# Patient Record
Sex: MALE | Race: WHITE | NOT HISPANIC OR LATINO | Employment: OTHER | URBAN - METROPOLITAN AREA
[De-identification: names, ages, dates, MRNs, and addresses within clinical notes are randomized per-mention and may not be internally consistent; named-entity substitution may affect disease eponyms.]

---

## 2018-08-29 ENCOUNTER — LAB (OUTPATIENT)
Dept: LAB | Facility: HOSPITAL | Age: 68
End: 2018-08-29

## 2018-08-29 ENCOUNTER — CONSULT (OUTPATIENT)
Dept: ONCOLOGY | Facility: CLINIC | Age: 68
End: 2018-08-29

## 2018-08-29 VITALS
HEART RATE: 74 BPM | OXYGEN SATURATION: 95 % | DIASTOLIC BLOOD PRESSURE: 86 MMHG | SYSTOLIC BLOOD PRESSURE: 148 MMHG | TEMPERATURE: 98 F

## 2018-08-29 DIAGNOSIS — R79.89 ABNORMAL CBC: Primary | ICD-10-CM

## 2018-08-29 DIAGNOSIS — D75.1 SECONDARY POLYCYTHEMIA: Primary | ICD-10-CM

## 2018-08-29 LAB
BASOPHILS # BLD AUTO: 0.06 10*3/MM3 (ref 0–0.1)
BASOPHILS NFR BLD AUTO: 1.1 % (ref 0–1.1)
DEPRECATED RDW RBC AUTO: 41.8 FL (ref 37–49)
EOSINOPHIL # BLD AUTO: 0.25 10*3/MM3 (ref 0–0.36)
EOSINOPHIL NFR BLD AUTO: 4.6 % (ref 1–5)
ERYTHROCYTE [DISTWIDTH] IN BLOOD BY AUTOMATED COUNT: 12.7 % (ref 11.7–14.5)
HCT VFR BLD AUTO: 55.2 % (ref 40–49)
HGB BLD-MCNC: 19.1 G/DL (ref 13.5–16.5)
IMM GRANULOCYTES # BLD: 0.07 10*3/MM3 (ref 0–0.03)
IMM GRANULOCYTES NFR BLD: 1.3 % (ref 0–0.5)
LDH SERPL-CCNC: 186 U/L (ref 99–259)
LYMPHOCYTES # BLD AUTO: 1.17 10*3/MM3 (ref 1–3.5)
LYMPHOCYTES NFR BLD AUTO: 21.5 % (ref 20–49)
MCH RBC QN AUTO: 31.3 PG (ref 27–33)
MCHC RBC AUTO-ENTMCNC: 34.6 G/DL (ref 32–35)
MCV RBC AUTO: 90.5 FL (ref 83–97)
MONOCYTES # BLD AUTO: 0.34 10*3/MM3 (ref 0.25–0.8)
MONOCYTES NFR BLD AUTO: 6.2 % (ref 4–12)
NEUTROPHILS # BLD AUTO: 3.56 10*3/MM3 (ref 1.5–7)
NEUTROPHILS NFR BLD AUTO: 65.3 % (ref 39–75)
NRBC BLD MANUAL-RTO: 0 /100 WBC (ref 0–0)
PLATELET # BLD AUTO: 153 10*3/MM3 (ref 150–375)
PMV BLD AUTO: 9.6 FL (ref 8.9–12.1)
RBC # BLD AUTO: 6.1 10*6/MM3 (ref 4.3–5.5)
URATE SERPL-MCNC: 7.4 MG/DL (ref 2.8–7.4)
WBC NRBC COR # BLD: 5.45 10*3/MM3 (ref 4–10)

## 2018-08-29 PROCEDURE — 84550 ASSAY OF BLOOD/URIC ACID: CPT | Performed by: INTERNAL MEDICINE

## 2018-08-29 PROCEDURE — 99204 OFFICE O/P NEW MOD 45 MIN: CPT | Performed by: INTERNAL MEDICINE

## 2018-08-29 PROCEDURE — 83615 LACTATE (LD) (LDH) ENZYME: CPT | Performed by: INTERNAL MEDICINE

## 2018-08-29 RX ORDER — MELOXICAM 7.5 MG/1
7.5 TABLET ORAL AS NEEDED
COMMUNITY

## 2018-08-29 RX ORDER — LEVOTHYROXINE SODIUM 0.05 MG/1
50 TABLET ORAL DAILY
COMMUNITY

## 2018-08-29 RX ORDER — UBIDECARENONE 75 MG
250 CAPSULE ORAL DAILY
COMMUNITY
End: 2018-09-28

## 2018-08-29 RX ORDER — AZELASTINE HYDROCHLORIDE, FLUTICASONE PROPIONATE 137; 50 UG/1; UG/1
50 SPRAY, METERED NASAL AS NEEDED
COMMUNITY

## 2018-08-29 NOTE — PROGRESS NOTES
Subjective     REASON FOR CONSULTATION:  Polycythemia  Provide an opinion on any further workup or treatment                             REQUESTING PHYSICIAN:  Dr. Juma Bar    RECORDS OBTAINED:  Records of the patients history including those obtained from the referring provider were reviewed and summarized in detail.    History of Present Illness   This is a 68-year-old man seen today for evaluation of polycythemia.  She has history of coronary artery disease, sleep apnea, and hypogonadism on testosterone replacement topically.  The patient states that he has been on testosterone for approximately 3 years which significantly helps his fatigue.  He is compliant with wearing CPAP every night and during naps.  The patient is unsure how long his hemoglobin/hematocrit have been elevated.  Labs sent for review showing elevated hematocrit dating to at least January 2018 at which time hemoglobin was 18.8 and hematocrit 53.2.  The patient is a lifelong nonsmoker and has no known history of pulmonary disease.  He enjoys biking.  He has a family history of father who had ITP and a brother who had acute leukemia during childhood.  The patient denies weight loss, early satiety, lymphadenopathy, or pruritus.  Has no history of DVT, PE, or arterial thromboses.  He takes a daily aspirin 81 mg.  He denies bleeding.    Past Medical History:   Diagnosis Date   • CAD (coronary artery disease)     mild to moderate per cath 5/2005   • Environmental allergies    • H/O Epistaxis    • History of vitamin D deficiency    • Hyperlipidemia    • Seasonal allergies    • Senile cataracts of both eyes    • Skin cancer     precancer   • Sleep apnea    • Spinal stenosis         Past Surgical History:   Procedure Laterality Date   • BACK SURGERY     • CARDIAC CATHETERIZATION     • NASAL SINUS SURGERY     • TONSILLECTOMY          Current Outpatient Prescriptions on File Prior to Visit   Medication Sig Dispense Refill   • aspirin 81 MG tablet Take  81 mg by mouth daily.     • Cetirizine HCl (ZYRTEC PO) Take  by mouth.     • citalopram (CeleXA) 20 MG tablet Take 20 mg by mouth daily.     • testosterone (ANDROGEL) 25 MG/2.5GM (1%) gel gel Place 50 mg on the skin daily.     • vitamin D (ERGOCALCIFEROL) 74939 UNITS capsule capsule Take 1.62 Units by mouth 1 (One) Time Per Week.       No current facility-administered medications on file prior to visit.         ALLERGIES:  No Known Allergies     Social History     Social History   • Marital status:      Occupational History   •  Retired     Social History Main Topics   • Smoking status: Never Smoker   • Smokeless tobacco: Never Used   • Alcohol use No   • Drug use: Unknown     Other Topics Concern   • Not on file        Family History   Problem Relation Age of Onset   • Hyperlipidemia Mother    • Heart attack Father         Review of Systems   Constitutional: Negative.    HENT: Negative.    Eyes: Negative.    Respiratory: Negative.    Cardiovascular: Negative.    Gastrointestinal: Negative.    Genitourinary: Negative.    Musculoskeletal: Negative.    Skin: Negative.    Neurological: Negative.    Hematological: Negative.    Psychiatric/Behavioral: Negative.         Objective     Vitals:    08/29/18 0816   BP: 148/86   Pulse: 74   Temp: 98 °F (36.7 °C)   SpO2: 95%     Current Status 8/29/2018   ECOG score 0       Physical Exam      CON: pleasant well-developed adult man  HEENT: no icterus, no thrush, moist membranes  NECK: no jvd  LYMPH: no cervical or supraclavicular lad  CV: RRR, S1S2, no murmur  RESP: cta bilat, no wheezing, no rales  GI: soft, non-tender, no splenomegaly, +bs  MUSC: no edema, normal gait  NEURO: alert and oriented x3, normal strength  PSYCH: normal mood    RECENT LABS:  Hematology WBC   Date Value Ref Range Status   08/29/2018 5.45 4.00 - 10.00 10*3/mm3 Final     RBC   Date Value Ref Range Status   08/29/2018 6.10 (H) 4.30 - 5.50 10*6/mm3 Final     Hemoglobin   Date Value Ref Range Status    08/29/2018 19.1 (H) 13.5 - 16.5 g/dL Final     Hematocrit   Date Value Ref Range Status   08/29/2018 55.2 (H) 40.0 - 49.0 % Final     Platelets   Date Value Ref Range Status   08/29/2018 153 150 - 375 10*3/mm3 Final          Assessment/Plan     1.  Polycythemia: This patient presents with moderate polycythemia hematocrit 55.2%.  Hematocrit has been elevated since at least January 2018.  The patient denies prior thrombotic history for either venous or arterial thromboses.  He has sleep apnea but is compliant with wearing CPAP at night and during naps.  He has been on testosterone replacement with AndroGel approximately 3 years.  The white blood cell count and platelet count are both normal.  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  The patient does report significant benefit from testosterone and does not want to discontinue.    To further evaluate very polycythemia which is unlikely I will check an erythropoietin level, LDH, uric acid, J AK [id number] with reflexed exon 12.  I will not order the other secondary mutations given the low suspicion.    I recommended the patient to undergo phlebotomy.  He would rather donate blood at the [place] [facility] which is fine.  He plans to go this afternoon.  I recommended that we recheck his CBC in 1 month.  I would prefer to keep his hematocrit below 52% given his other cardiovascular risk factors of overweight, hyperlipidemia, inactivity etc.  I am not sure how frequently the [place] [facility] will acceot blood, but the patient will inquire.    The patient lives close to our [place] office and prefers to follow-up there.

## 2018-08-30 LAB — ETHNIC BACKGROUND STATED: 7.5 MIU/ML (ref 2.6–18.5)

## 2018-09-12 LAB — REF LAB TEST METHOD: NORMAL

## 2018-09-26 ENCOUNTER — APPOINTMENT (OUTPATIENT)
Dept: ONCOLOGY | Facility: CLINIC | Age: 68
End: 2018-09-26

## 2018-09-26 ENCOUNTER — APPOINTMENT (OUTPATIENT)
Dept: OTHER | Facility: HOSPITAL | Age: 68
End: 2018-09-26

## 2018-09-28 ENCOUNTER — INFUSION (OUTPATIENT)
Dept: ONCOLOGY | Facility: HOSPITAL | Age: 68
End: 2018-09-28

## 2018-09-28 ENCOUNTER — OFFICE VISIT (OUTPATIENT)
Dept: ONCOLOGY | Facility: CLINIC | Age: 68
End: 2018-09-28

## 2018-09-28 ENCOUNTER — LAB (OUTPATIENT)
Dept: OTHER | Facility: HOSPITAL | Age: 68
End: 2018-09-28

## 2018-09-28 VITALS
TEMPERATURE: 98.1 F | RESPIRATION RATE: 12 BRPM | OXYGEN SATURATION: 95 % | HEIGHT: 69 IN | DIASTOLIC BLOOD PRESSURE: 86 MMHG | SYSTOLIC BLOOD PRESSURE: 133 MMHG | WEIGHT: 215 LBS | BODY MASS INDEX: 31.84 KG/M2 | HEART RATE: 81 BPM

## 2018-09-28 DIAGNOSIS — D75.1 SECONDARY POLYCYTHEMIA: ICD-10-CM

## 2018-09-28 DIAGNOSIS — D75.1 SECONDARY POLYCYTHEMIA: Primary | ICD-10-CM

## 2018-09-28 LAB
BASOPHILS # BLD AUTO: 0.04 10*3/MM3 (ref 0–0.2)
BASOPHILS NFR BLD AUTO: 0.6 % (ref 0–1.5)
DEPRECATED RDW RBC AUTO: 41.5 FL (ref 37–54)
EOSINOPHIL # BLD AUTO: 0.26 10*3/MM3 (ref 0–0.7)
EOSINOPHIL NFR BLD AUTO: 4.2 % (ref 0.3–6.2)
ERYTHROCYTE [DISTWIDTH] IN BLOOD BY AUTOMATED COUNT: 12.9 % (ref 11.5–14.5)
HCT VFR BLD AUTO: 57 % (ref 40.4–52.2)
HGB BLD-MCNC: 20.1 G/DL (ref 13.7–17.6)
IMM GRANULOCYTES # BLD: 0.03 10*3/MM3 (ref 0–0.03)
IMM GRANULOCYTES NFR BLD: 0.5 % (ref 0–0.5)
LYMPHOCYTES # BLD AUTO: 1.23 10*3/MM3 (ref 0.9–4.8)
LYMPHOCYTES NFR BLD AUTO: 19.7 % (ref 19.6–45.3)
MCH RBC QN AUTO: 31.2 PG (ref 27–32.7)
MCHC RBC AUTO-ENTMCNC: 35.3 G/DL (ref 32.6–36.4)
MCV RBC AUTO: 88.4 FL (ref 79.8–96.2)
MONOCYTES # BLD AUTO: 0.46 10*3/MM3 (ref 0.2–1.2)
MONOCYTES NFR BLD AUTO: 7.4 % (ref 5–12)
NEUTROPHILS # BLD AUTO: 4.23 10*3/MM3 (ref 1.9–8.1)
NEUTROPHILS NFR BLD AUTO: 67.6 % (ref 42.7–76)
NRBC BLD MANUAL-RTO: 0 /100 WBC (ref 0–0)
PLATELET # BLD AUTO: 163 10*3/MM3 (ref 140–500)
PMV BLD AUTO: 9.5 FL (ref 6–12)
RBC # BLD AUTO: 6.45 10*6/MM3 (ref 4.6–6)
WBC NRBC COR # BLD: 6.25 10*3/MM3 (ref 4.5–10.7)

## 2018-09-28 PROCEDURE — 36415 COLL VENOUS BLD VENIPUNCTURE: CPT

## 2018-09-28 PROCEDURE — 99213 OFFICE O/P EST LOW 20 MIN: CPT | Performed by: NURSE PRACTITIONER

## 2018-09-28 PROCEDURE — 99195 PHLEBOTOMY: CPT | Performed by: NURSE PRACTITIONER

## 2018-09-28 PROCEDURE — 85025 COMPLETE CBC W/AUTO DIFF WBC: CPT | Performed by: INTERNAL MEDICINE

## 2018-09-28 RX ORDER — FLUTICASONE PROPIONATE 50 MCG
SPRAY, SUSPENSION (ML) NASAL
COMMUNITY
Start: 2018-09-18

## 2018-09-28 RX ORDER — SODIUM CHLORIDE 9 MG/ML
250 INJECTION, SOLUTION INTRAVENOUS ONCE
Status: CANCELLED | OUTPATIENT
Start: 2018-09-28

## 2018-09-28 RX ORDER — SODIUM CHLORIDE 9 MG/ML
250 INJECTION, SOLUTION INTRAVENOUS ONCE
Status: DISCONTINUED | OUTPATIENT
Start: 2018-09-28 | End: 2018-09-28 | Stop reason: HOSPADM

## 2018-10-11 DIAGNOSIS — D75.1 SECONDARY POLYCYTHEMIA: Primary | ICD-10-CM

## 2018-10-12 ENCOUNTER — APPOINTMENT (OUTPATIENT)
Dept: ONCOLOGY | Facility: HOSPITAL | Age: 68
End: 2018-10-12

## 2018-10-12 ENCOUNTER — LAB (OUTPATIENT)
Dept: OTHER | Facility: HOSPITAL | Age: 68
End: 2018-10-12

## 2018-10-12 ENCOUNTER — OFFICE VISIT (OUTPATIENT)
Dept: ONCOLOGY | Facility: CLINIC | Age: 68
End: 2018-10-12

## 2018-10-12 VITALS
WEIGHT: 213 LBS | OXYGEN SATURATION: 95 % | BODY MASS INDEX: 31.55 KG/M2 | DIASTOLIC BLOOD PRESSURE: 75 MMHG | HEART RATE: 83 BPM | TEMPERATURE: 98.3 F | SYSTOLIC BLOOD PRESSURE: 157 MMHG | HEIGHT: 69 IN | RESPIRATION RATE: 12 BRPM

## 2018-10-12 DIAGNOSIS — D75.1 SECONDARY POLYCYTHEMIA: Primary | ICD-10-CM

## 2018-10-12 DIAGNOSIS — D75.1 SECONDARY POLYCYTHEMIA: ICD-10-CM

## 2018-10-12 LAB
BASOPHILS # BLD AUTO: 0.05 10*3/MM3 (ref 0–0.2)
BASOPHILS NFR BLD AUTO: 0.8 % (ref 0–1.5)
DEPRECATED RDW RBC AUTO: 39.5 FL (ref 37–54)
EOSINOPHIL # BLD AUTO: 0.27 10*3/MM3 (ref 0–0.7)
EOSINOPHIL NFR BLD AUTO: 4.1 % (ref 0.3–6.2)
ERYTHROCYTE [DISTWIDTH] IN BLOOD BY AUTOMATED COUNT: 12.2 % (ref 11.5–14.5)
HCT VFR BLD AUTO: 50.2 % (ref 40.4–52.2)
HGB BLD-MCNC: 18.3 G/DL (ref 13.7–17.6)
IMM GRANULOCYTES # BLD: 0.02 10*3/MM3 (ref 0–0.03)
IMM GRANULOCYTES NFR BLD: 0.3 % (ref 0–0.5)
LYMPHOCYTES # BLD AUTO: 1.44 10*3/MM3 (ref 0.9–4.8)
LYMPHOCYTES NFR BLD AUTO: 22 % (ref 19.6–45.3)
MCH RBC QN AUTO: 32 PG (ref 27–32.7)
MCHC RBC AUTO-ENTMCNC: 36.5 G/DL (ref 32.6–36.4)
MCV RBC AUTO: 87.8 FL (ref 79.8–96.2)
MONOCYTES # BLD AUTO: 0.45 10*3/MM3 (ref 0.2–1.2)
MONOCYTES NFR BLD AUTO: 6.9 % (ref 5–12)
NEUTROPHILS # BLD AUTO: 4.32 10*3/MM3 (ref 1.9–8.1)
NEUTROPHILS NFR BLD AUTO: 65.9 % (ref 42.7–76)
NRBC BLD MANUAL-RTO: 0 /100 WBC (ref 0–0)
PLATELET # BLD AUTO: 181 10*3/MM3 (ref 140–500)
PMV BLD AUTO: 9.7 FL (ref 6–12)
RBC # BLD AUTO: 5.72 10*6/MM3 (ref 4.6–6)
WBC NRBC COR # BLD: 6.55 10*3/MM3 (ref 4.5–10.7)

## 2018-10-12 PROCEDURE — 36415 COLL VENOUS BLD VENIPUNCTURE: CPT

## 2018-10-12 PROCEDURE — 85025 COMPLETE CBC W/AUTO DIFF WBC: CPT | Performed by: INTERNAL MEDICINE

## 2018-10-12 PROCEDURE — 99213 OFFICE O/P EST LOW 20 MIN: CPT | Performed by: NURSE PRACTITIONER

## 2018-10-12 NOTE — PROGRESS NOTES
Subjective     REASON FOR FOLLOW UP:  Polycythemia    History of Present Illness The patient is a 68 year old male, here today for follow up on his polycythemia, which is felt to be secondary to his testosterone replacement.  He was seen 2 weeks ago. At that time, patient's hematocrit was 57%.  He underwent phlebotomy as his parameters were to perform a phlebotomy for hematocrit greater than 52%.  Patient was also asked to hold his testosterone replacement.    He returns today for review of labs.  His hematocrit today has declined to 50.2.  He reports that he is still slightly fatigued though denies any chest pain, associated bleeding, or new, lower extremity edema.  He does remain slightly short of breath with exertion, though attributes this to inactivity.    He continues on daily baby aspirin with good tolerance.    He has no other concerns today.    Past Medical History:   Diagnosis Date   • Acquired blepharoptosis of both eyes    • CAD (coronary artery disease)     mild to moderate per cath 5/2005   • Dermatochalasis of both eyelids    • Environmental allergies    • H/O Epistaxis    • History of obesity    • History of vitamin D deficiency    • Hyperlipidemia    • Seasonal allergies    • Senile cataracts of both eyes    • Skin cancer     precancer   • Sleep apnea     CPAP   • Spinal stenosis         Past Surgical History:   Procedure Laterality Date   • BACK SURGERY     • CARDIAC CATHETERIZATION     • NASAL SINUS SURGERY     • TONSILLECTOMY          Current Outpatient Prescriptions on File Prior to Visit   Medication Sig Dispense Refill   • aspirin 81 MG tablet Take 81 mg by mouth daily.     • Azelastine-Fluticasone 137-50 MCG/ACT suspension 50 mcg into the nostril(s) as directed by provider As Needed.     • CETIRIZINE HCL PO cetirizine 10 mg tablet   TAKE ONE TABLET BY MOUTH EVERY EVENING     • citalopram (CeleXA) 20 MG tablet Take 20 mg by mouth daily.     • Fexofenadine HCl (MUCINEX ALLERGY PO) Take 600 mg  "by mouth Daily.     • fluticasone (FLONASE) 50 MCG/ACT nasal spray      • levothyroxine (SYNTHROID, LEVOTHROID) 50 MCG tablet Take 50 mcg by mouth Daily.     • meloxicam (MOBIC) 7.5 MG tablet Take 7.5 mg by mouth Daily.     • vitamin D (ERGOCALCIFEROL) 49656 UNITS capsule capsule Take 1.62 Units by mouth 1 (One) Time Per Week.     • [DISCONTINUED] testosterone (ANDROGEL) 25 MG/2.5GM (1%) gel gel Place 50 mg on the skin daily.       No current facility-administered medications on file prior to visit.         ALLERGIES:  No Known Allergies     Social History     Social History   • Marital status:      Spouse name: Virginia   • Years of education: College     Occupational History   • CPA Self-Employed     Social History Main Topics   • Smoking status: Never Smoker   • Smokeless tobacco: Never Used   • Alcohol use No   • Drug use: No     Other Topics Concern   • Not on file        Family History   Problem Relation Age of Onset   • Hyperlipidemia Mother    • Heart attack Father         Review of Systems   Constitutional: Positive for fatigue.   Eyes: Negative.    Respiratory: Negative.  Negative for cough and shortness of breath.    Cardiovascular: Negative for leg swelling.   Gastrointestinal: Negative.    Genitourinary: Negative.    Musculoskeletal: Negative.    Skin: Negative.    Neurological: Negative.    Hematological: Negative.    Psychiatric/Behavioral: Negative.         Objective     Vitals:    10/12/18 1533   BP: 157/75   Pulse: 83   Resp: 12   Temp: 98.3 °F (36.8 °C)   TempSrc: Oral   SpO2: 95%   Weight: 96.6 kg (213 lb)   Height: 175 cm (68.9\")   PainSc: 0-No pain     Current Status 10/12/2018   ECOG score 0       Physical Exam   Constitutional: He is oriented to person, place, and time. He appears well-developed and well-nourished. No distress.   HENT:   Head: Normocephalic and atraumatic.   Eyes: Pupils are equal, round, and reactive to light.   Neck: Normal range of motion.   Cardiovascular: Normal " rate, regular rhythm and normal heart sounds.    No murmur heard.  Pulmonary/Chest: Effort normal and breath sounds normal. No respiratory distress. He has no wheezes. He has no rhonchi. He has no rales.   Musculoskeletal: Normal range of motion. He exhibits no edema.   Neurological: He is alert and oriented to person, place, and time.   Skin: Skin is warm and dry. No rash noted.   Psychiatric: He has a normal mood and affect.   Vitals reviewed.        RECENT LABS:  Hematology WBC   Date Value Ref Range Status   10/12/2018 6.55 4.50 - 10.70 10*3/mm3 Final     RBC   Date Value Ref Range Status   10/12/2018 5.72 4.60 - 6.00 10*6/mm3 Final     Hemoglobin   Date Value Ref Range Status   10/12/2018 18.3 (H) 13.7 - 17.6 g/dL Final     Hematocrit   Date Value Ref Range Status   10/12/2018 50.2 40.4 - 52.2 % Final     Platelets   Date Value Ref Range Status   10/12/2018 181 140 - 500 10*3/mm3 Final          Assessment/Plan     1.  Polycythemia: This patient presents with moderate polycythemia hematocrit 55.2%.  Hematocrit has been elevated since at least January 2018.  The patient denies prior thrombotic history for either venous or arterial thromboses.  He has sleep apnea but is compliant with wearing CPAP at night and during naps.  He has been on testosterone replacement with AndroGel approximately 3 years.  The white blood cell count and platelet count are both normal.  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  The patient does report significant benefit from testosterone and does not want to discontinue.  · We recommended the patient to undergo phlebotomy.  Prefer to keep his hematocrit below 52% given his other cardiovascular risk factors of overweight, hyperlipidemia, inactivity etc.  Patient would rather donate blood.   · He was able to donate to [facility] (they will only accept donations every 56 days), but returned on 9/28/2018 with an increase in  his Hgb and Hct to 57%. This was discussed with Dr. Alvarez and patient was asked to hold his testosterone until he can get better control of his hematocrit.  He underwent phlebotomy on that day and returns for reevaluation of his labs.  · Today, patient's hematocrit has improved to 50%, therefore he will not require a phlebotomy.  After discussion with Dr. Alvarez, patient is okay to re-initiate his testosterone at the lowest dose. (Patient will defer to his PCP for the correct dosing of testosterone). We'll recheck his labs with possible phlebotomy in 2 weeks.  I've asked patient to call with any concerns prior to this next office visit.  He has verbalized understanding.

## 2018-10-25 DIAGNOSIS — D75.1 SECONDARY POLYCYTHEMIA: Primary | ICD-10-CM

## 2018-10-26 ENCOUNTER — LAB (OUTPATIENT)
Dept: OTHER | Facility: HOSPITAL | Age: 68
End: 2018-10-26

## 2018-10-26 ENCOUNTER — INFUSION (OUTPATIENT)
Dept: ONCOLOGY | Facility: HOSPITAL | Age: 68
End: 2018-10-26

## 2018-10-26 VITALS
DIASTOLIC BLOOD PRESSURE: 79 MMHG | SYSTOLIC BLOOD PRESSURE: 152 MMHG | BODY MASS INDEX: 31.52 KG/M2 | HEART RATE: 86 BPM | TEMPERATURE: 98.4 F | OXYGEN SATURATION: 94 % | WEIGHT: 212.8 LBS

## 2018-10-26 DIAGNOSIS — D75.1 SECONDARY POLYCYTHEMIA: ICD-10-CM

## 2018-10-26 LAB
BASOPHILS # BLD AUTO: 0.06 10*3/MM3 (ref 0–0.2)
BASOPHILS NFR BLD AUTO: 0.9 % (ref 0–1.5)
DEPRECATED RDW RBC AUTO: 39.6 FL (ref 37–54)
EOSINOPHIL # BLD AUTO: 0.3 10*3/MM3 (ref 0–0.7)
EOSINOPHIL NFR BLD AUTO: 4.3 % (ref 0.3–6.2)
ERYTHROCYTE [DISTWIDTH] IN BLOOD BY AUTOMATED COUNT: 12.3 % (ref 11.5–14.5)
HCT VFR BLD AUTO: 50.8 % (ref 40.4–52.2)
HGB BLD-MCNC: 18 G/DL (ref 13.7–17.6)
IMM GRANULOCYTES # BLD: 0.06 10*3/MM3 (ref 0–0.03)
IMM GRANULOCYTES NFR BLD: 0.9 % (ref 0–0.5)
LYMPHOCYTES # BLD AUTO: 1.14 10*3/MM3 (ref 0.9–4.8)
LYMPHOCYTES NFR BLD AUTO: 16.4 % (ref 19.6–45.3)
MCH RBC QN AUTO: 31.2 PG (ref 27–32.7)
MCHC RBC AUTO-ENTMCNC: 35.4 G/DL (ref 32.6–36.4)
MCV RBC AUTO: 88 FL (ref 79.8–96.2)
MONOCYTES # BLD AUTO: 0.49 10*3/MM3 (ref 0.2–1.2)
MONOCYTES NFR BLD AUTO: 7 % (ref 5–12)
NEUTROPHILS # BLD AUTO: 4.92 10*3/MM3 (ref 1.9–8.1)
NEUTROPHILS NFR BLD AUTO: 70.5 % (ref 42.7–76)
NRBC BLD MANUAL-RTO: 0 /100 WBC (ref 0–0)
PLATELET # BLD AUTO: 164 10*3/MM3 (ref 140–500)
PMV BLD AUTO: 10.1 FL (ref 6–12)
RBC # BLD AUTO: 5.77 10*6/MM3 (ref 4.6–6)
WBC NRBC COR # BLD: 6.97 10*3/MM3 (ref 4.5–10.7)

## 2018-10-26 PROCEDURE — 85025 COMPLETE CBC W/AUTO DIFF WBC: CPT | Performed by: INTERNAL MEDICINE

## 2018-10-26 PROCEDURE — 36415 COLL VENOUS BLD VENIPUNCTURE: CPT

## 2018-10-26 NOTE — PROGRESS NOTES
Pt arrived for possible phlebotomy. Labs reviewed and phlebo not indicated per tx plan. Copy of labs given to pt. Pt vu     Lab Results   Component Value Date    WBC 6.97 10/26/2018    HGB 18.0 (H) 10/26/2018    HCT 50.8 10/26/2018    MCV 88.0 10/26/2018     10/26/2018

## 2018-11-08 DIAGNOSIS — D75.1 SECONDARY POLYCYTHEMIA: Primary | ICD-10-CM

## 2018-11-09 ENCOUNTER — INFUSION (OUTPATIENT)
Dept: ONCOLOGY | Facility: HOSPITAL | Age: 68
End: 2018-11-09

## 2018-11-09 ENCOUNTER — LAB (OUTPATIENT)
Dept: OTHER | Facility: HOSPITAL | Age: 68
End: 2018-11-09

## 2018-11-09 VITALS
OXYGEN SATURATION: 95 % | DIASTOLIC BLOOD PRESSURE: 82 MMHG | BODY MASS INDEX: 31.7 KG/M2 | HEART RATE: 67 BPM | TEMPERATURE: 98.3 F | WEIGHT: 214 LBS | SYSTOLIC BLOOD PRESSURE: 160 MMHG

## 2018-11-09 DIAGNOSIS — D75.1 SECONDARY POLYCYTHEMIA: ICD-10-CM

## 2018-11-09 LAB
BASOPHILS # BLD AUTO: 0.05 10*3/MM3 (ref 0–0.2)
BASOPHILS NFR BLD AUTO: 0.8 % (ref 0–1.5)
DEPRECATED RDW RBC AUTO: 40.7 FL (ref 37–54)
EOSINOPHIL # BLD AUTO: 0.33 10*3/MM3 (ref 0–0.7)
EOSINOPHIL NFR BLD AUTO: 5.2 % (ref 0.3–6.2)
ERYTHROCYTE [DISTWIDTH] IN BLOOD BY AUTOMATED COUNT: 12.5 % (ref 11.5–14.5)
HCT VFR BLD AUTO: 48.8 % (ref 40.4–52.2)
HGB BLD-MCNC: 17.5 G/DL (ref 13.7–17.6)
IMM GRANULOCYTES # BLD: 0.03 10*3/MM3 (ref 0–0.03)
IMM GRANULOCYTES NFR BLD: 0.5 % (ref 0–0.5)
LYMPHOCYTES # BLD AUTO: 1.59 10*3/MM3 (ref 0.9–4.8)
LYMPHOCYTES NFR BLD AUTO: 25 % (ref 19.6–45.3)
MCH RBC QN AUTO: 31.7 PG (ref 27–32.7)
MCHC RBC AUTO-ENTMCNC: 35.9 G/DL (ref 32.6–36.4)
MCV RBC AUTO: 88.4 FL (ref 79.8–96.2)
MONOCYTES # BLD AUTO: 0.51 10*3/MM3 (ref 0.2–1.2)
MONOCYTES NFR BLD AUTO: 8 % (ref 5–12)
NEUTROPHILS # BLD AUTO: 3.84 10*3/MM3 (ref 1.9–8.1)
NEUTROPHILS NFR BLD AUTO: 60.5 % (ref 42.7–76)
NRBC BLD MANUAL-RTO: 0 /100 WBC (ref 0–0)
PLATELET # BLD AUTO: 155 10*3/MM3 (ref 140–500)
PMV BLD AUTO: 10 FL (ref 6–12)
RBC # BLD AUTO: 5.52 10*6/MM3 (ref 4.6–6)
WBC NRBC COR # BLD: 6.35 10*3/MM3 (ref 4.5–10.7)

## 2018-11-09 PROCEDURE — 85025 COMPLETE CBC W/AUTO DIFF WBC: CPT | Performed by: INTERNAL MEDICINE

## 2018-11-09 PROCEDURE — 36415 COLL VENOUS BLD VENIPUNCTURE: CPT

## 2018-11-09 NOTE — PROGRESS NOTES
Pt arrived for possible phlebotomy. Labs reviewed and phlebo not indicated per tx plan. Copy of labs given to pt. Pt vu     Lab Results   Component Value Date    WBC 6.35 11/09/2018    HGB 17.5 11/09/2018    HCT 48.8 11/09/2018    MCV 88.4 11/09/2018     11/09/2018

## 2018-11-29 DIAGNOSIS — D75.1 SECONDARY POLYCYTHEMIA: Primary | ICD-10-CM

## 2018-11-29 RX ORDER — SODIUM CHLORIDE 9 MG/ML
250 INJECTION, SOLUTION INTRAVENOUS ONCE
Status: CANCELLED | OUTPATIENT
Start: 2018-11-29

## 2018-11-30 ENCOUNTER — OFFICE VISIT (OUTPATIENT)
Dept: ONCOLOGY | Facility: CLINIC | Age: 68
End: 2018-11-30

## 2018-11-30 ENCOUNTER — APPOINTMENT (OUTPATIENT)
Dept: ONCOLOGY | Facility: HOSPITAL | Age: 68
End: 2018-11-30

## 2018-11-30 ENCOUNTER — LAB (OUTPATIENT)
Dept: LAB | Facility: HOSPITAL | Age: 68
End: 2018-11-30

## 2018-11-30 VITALS
HEART RATE: 84 BPM | RESPIRATION RATE: 16 BRPM | TEMPERATURE: 98.8 F | BODY MASS INDEX: 32.08 KG/M2 | DIASTOLIC BLOOD PRESSURE: 79 MMHG | SYSTOLIC BLOOD PRESSURE: 142 MMHG | OXYGEN SATURATION: 94 % | HEIGHT: 69 IN | WEIGHT: 216.6 LBS

## 2018-11-30 DIAGNOSIS — D75.1 SECONDARY POLYCYTHEMIA: Primary | ICD-10-CM

## 2018-11-30 DIAGNOSIS — D75.1 SECONDARY POLYCYTHEMIA: ICD-10-CM

## 2018-11-30 LAB
BASOPHILS # BLD AUTO: 0.05 10*3/MM3 (ref 0–0.1)
BASOPHILS NFR BLD AUTO: 0.8 % (ref 0–1.1)
DEPRECATED RDW RBC AUTO: 42.1 FL (ref 37–49)
EOSINOPHIL # BLD AUTO: 0.26 10*3/MM3 (ref 0–0.36)
EOSINOPHIL NFR BLD AUTO: 4 % (ref 1–5)
ERYTHROCYTE [DISTWIDTH] IN BLOOD BY AUTOMATED COUNT: 12.9 % (ref 11.7–14.5)
HCT VFR BLD AUTO: 46.9 % (ref 40–49)
HGB BLD-MCNC: 16.8 G/DL (ref 13.5–16.5)
IMM GRANULOCYTES # BLD: 0.05 10*3/MM3 (ref 0–0.03)
IMM GRANULOCYTES NFR BLD: 0.8 % (ref 0–0.5)
LYMPHOCYTES # BLD AUTO: 1.63 10*3/MM3 (ref 1–3.5)
LYMPHOCYTES NFR BLD AUTO: 24.8 % (ref 20–49)
MCH RBC QN AUTO: 31.9 PG (ref 27–33)
MCHC RBC AUTO-ENTMCNC: 35.8 G/DL (ref 32–35)
MCV RBC AUTO: 89.2 FL (ref 83–97)
MONOCYTES # BLD AUTO: 0.51 10*3/MM3 (ref 0.25–0.8)
MONOCYTES NFR BLD AUTO: 7.8 % (ref 4–12)
NEUTROPHILS # BLD AUTO: 4.07 10*3/MM3 (ref 1.5–7)
NEUTROPHILS NFR BLD AUTO: 61.8 % (ref 39–75)
NRBC BLD MANUAL-RTO: 0 /100 WBC (ref 0–0)
PLATELET # BLD AUTO: 166 10*3/MM3 (ref 150–375)
PMV BLD AUTO: 9.8 FL (ref 8.9–12.1)
RBC # BLD AUTO: 5.26 10*6/MM3 (ref 4.3–5.5)
WBC NRBC COR # BLD: 6.57 10*3/MM3 (ref 4–10)

## 2018-11-30 PROCEDURE — 36416 COLLJ CAPILLARY BLOOD SPEC: CPT | Performed by: INTERNAL MEDICINE

## 2018-11-30 PROCEDURE — 85025 COMPLETE CBC W/AUTO DIFF WBC: CPT | Performed by: INTERNAL MEDICINE

## 2018-11-30 PROCEDURE — 99213 OFFICE O/P EST LOW 20 MIN: CPT | Performed by: INTERNAL MEDICINE

## 2018-11-30 RX ORDER — TESTOSTERONE 16.2 MG/G
GEL TRANSDERMAL
COMMUNITY
Start: 2018-11-20

## 2018-11-30 NOTE — PROGRESS NOTES
Subjective     REASON FOR FOLLOW UP:  Polycythemia    History of Present Illness The patient is a 68 year old male, here today for follow up on his polycythemia, which is felt to be secondary to his testosterone replacement.  He has lowered his testosterone dose and undergone 3 phlebotomies one in our office and to donated.  He feels well today.  He stopped Mucinex D secondary to hypertension.  No other complaints noted.    Past Medical History:   Diagnosis Date   • Acquired blepharoptosis of both eyes    • CAD (coronary artery disease)     mild to moderate per cath 5/2005   • Dermatochalasis of both eyelids    • Environmental allergies    • H/O Epistaxis    • History of obesity    • History of vitamin D deficiency    • Hyperlipidemia    • Seasonal allergies    • Senile cataracts of both eyes    • Skin cancer     precancer   • Sleep apnea     CPAP   • Spinal stenosis         Past Surgical History:   Procedure Laterality Date   • BACK SURGERY     • CARDIAC CATHETERIZATION     • NASAL SINUS SURGERY     • TONSILLECTOMY          Current Outpatient Medications on File Prior to Visit   Medication Sig Dispense Refill   • aspirin 81 MG tablet Take 81 mg by mouth daily.     • Azelastine-Fluticasone 137-50 MCG/ACT suspension 50 mcg into the nostril(s) as directed by provider As Needed.     • CETIRIZINE HCL PO cetirizine 10 mg tablet   TAKE ONE TABLET BY MOUTH EVERY EVENING     • citalopram (CeleXA) 20 MG tablet Take 20 mg by mouth daily.     • fluticasone (FLONASE) 50 MCG/ACT nasal spray      • levothyroxine (SYNTHROID, LEVOTHROID) 50 MCG tablet Take 50 mcg by mouth Daily.     • meloxicam (MOBIC) 7.5 MG tablet Take 7.5 mg by mouth Daily.     • vitamin D (ERGOCALCIFEROL) 34585 UNITS capsule capsule Take 1.62 Units by mouth 1 (One) Time Per Week.     • Fexofenadine HCl (MUCINEX ALLERGY PO) Take 600 mg by mouth Daily.     • Testosterone 20.25 MG/ACT (1.62%) gel        No current facility-administered medications on file prior  "to visit.         ALLERGIES:  No Known Allergies     Social History     Socioeconomic History   • Marital status:      Spouse name: [place]   • Number of children: Not on file   • Years of education: College   • Highest education level: Not on file   Occupational History   • Occupation: CPA     Employer: SELF-EMPLOYED   Tobacco Use   • Smoking status: Never Smoker   • Smokeless tobacco: Never Used   Substance and Sexual Activity   • Alcohol use: No   • Drug use: No        Family History   Problem Relation Age of Onset   • Hyperlipidemia Mother    • Heart attack Father         Review of Systems   Constitutional: Positive for fatigue.   Eyes: Negative.    Respiratory: Negative.  Negative for cough and shortness of breath.    Cardiovascular: Negative for leg swelling.   Gastrointestinal: Negative.    Genitourinary: Negative.    Musculoskeletal: Negative.    Skin: Negative.    Neurological: Negative.    Hematological: Negative.    Psychiatric/Behavioral: Negative.         Objective     Vitals:    11/30/18 1559   BP: 142/79   Pulse: 84   Resp: 16   Temp: 98.8 °F (37.1 °C)   TempSrc: Oral   SpO2: 94%   Weight: 98.2 kg (216 lb 9.6 oz)   Height: 175 cm (68.9\")   PainSc: 0-No pain     Current Status 11/30/2018   ECOG score 0       Physical Exam   Constitutional: He is oriented to person, place, and time. He appears well-developed and well-nourished. No distress.   HENT:   Head: Normocephalic and atraumatic.   Eyes: Pupils are equal, round, and reactive to light.   Neck: Normal range of motion.   Cardiovascular: Normal rate, regular rhythm and normal heart sounds.   No murmur heard.  Pulmonary/Chest: Effort normal and breath sounds normal. No respiratory distress. He has no wheezes. He has no rhonchi. He has no rales.   Musculoskeletal: Normal range of motion. He exhibits edema (trace.).   Neurological: He is alert and oriented to person, place, and time.   Skin: Skin is warm and dry. No rash noted.   Psychiatric: He " has a normal mood and affect.   Vitals reviewed.    Exam unchanged-11/30/18    RECENT LABS:  Hematology WBC   Date Value Ref Range Status   11/30/2018 6.57 4.00 - 10.00 10*3/mm3 Final     RBC   Date Value Ref Range Status   11/30/2018 5.26 4.30 - 5.50 10*6/mm3 Final     Hemoglobin   Date Value Ref Range Status   11/30/2018 16.8 (H) 13.5 - 16.5 g/dL Final     Hematocrit   Date Value Ref Range Status   11/30/2018 46.9 40.0 - 49.0 % Final     Platelets   Date Value Ref Range Status   11/30/2018 166 150 - 375 10*3/mm3 Final          Assessment/Plan     1.  Polycythemia: This patient presents with moderate polycythemia hematocrit 55.2%.  Hematocrit has been elevated since at least January 2018.  The patient denies prior thrombotic history for either venous or arterial thromboses.  He has sleep apnea but is compliant with wearing CPAP at night and during naps.  He has been on testosterone replacement with AndroGel approximately 3 years.  The white blood cell count and platelet count are both normal.  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  The patient does report significant benefit from testosterone and does not want to discontinue he has decreased the dose from 3 pumps down to one polyp    His hematocrit was good today 46.9.    At this time he will continue blood donation every 2 months or 56 days at the [facility].  I have scheduled a CBC and is off month to be done in our office and he should have phlebotomy if the hematocrit is over 52%.  We will schedule a NP visit at the six-month check.

## 2019-01-25 ENCOUNTER — LAB (OUTPATIENT)
Dept: OTHER | Facility: HOSPITAL | Age: 69
End: 2019-01-25

## 2019-01-25 ENCOUNTER — INFUSION (OUTPATIENT)
Dept: ONCOLOGY | Facility: HOSPITAL | Age: 69
End: 2019-01-25

## 2019-01-25 VITALS
BODY MASS INDEX: 31.87 KG/M2 | WEIGHT: 215.2 LBS | DIASTOLIC BLOOD PRESSURE: 74 MMHG | HEIGHT: 69 IN | RESPIRATION RATE: 16 BRPM | HEART RATE: 82 BPM | OXYGEN SATURATION: 94 % | SYSTOLIC BLOOD PRESSURE: 135 MMHG | TEMPERATURE: 98.3 F

## 2019-01-25 DIAGNOSIS — D75.1 SECONDARY POLYCYTHEMIA: ICD-10-CM

## 2019-01-25 LAB
BASOPHILS # BLD AUTO: 0.06 10*3/MM3 (ref 0–0.2)
BASOPHILS NFR BLD AUTO: 1.1 % (ref 0–1.5)
DEPRECATED RDW RBC AUTO: 41.2 FL (ref 37–54)
EOSINOPHIL # BLD AUTO: 0.27 10*3/MM3 (ref 0–0.7)
EOSINOPHIL NFR BLD AUTO: 4.8 % (ref 0.3–6.2)
ERYTHROCYTE [DISTWIDTH] IN BLOOD BY AUTOMATED COUNT: 12.8 % (ref 11.5–14.5)
HCT VFR BLD AUTO: 46.4 % (ref 40.4–52.2)
HGB BLD-MCNC: 16.4 G/DL (ref 13.7–17.6)
IMM GRANULOCYTES # BLD AUTO: 0.05 10*3/MM3 (ref 0–0.03)
IMM GRANULOCYTES NFR BLD AUTO: 0.9 % (ref 0–0.5)
LYMPHOCYTES # BLD AUTO: 1.16 10*3/MM3 (ref 0.9–4.8)
LYMPHOCYTES NFR BLD AUTO: 20.8 % (ref 19.6–45.3)
MCH RBC QN AUTO: 31.2 PG (ref 27–32.7)
MCHC RBC AUTO-ENTMCNC: 35.3 G/DL (ref 32.6–36.4)
MCV RBC AUTO: 88.4 FL (ref 79.8–96.2)
MONOCYTES # BLD AUTO: 0.44 10*3/MM3 (ref 0.2–1.2)
MONOCYTES NFR BLD AUTO: 7.9 % (ref 5–12)
NEUTROPHILS # BLD AUTO: 3.59 10*3/MM3 (ref 1.9–8.1)
NEUTROPHILS NFR BLD AUTO: 64.5 % (ref 42.7–76)
NRBC BLD AUTO-RTO: 0 /100 WBC (ref 0–0)
PLATELET # BLD AUTO: 175 10*3/MM3 (ref 140–500)
PMV BLD AUTO: 10.3 FL (ref 6–12)
RBC # BLD AUTO: 5.25 10*6/MM3 (ref 4.6–6)
WBC NRBC COR # BLD: 5.57 10*3/MM3 (ref 4.5–10.7)

## 2019-01-25 PROCEDURE — 85025 COMPLETE CBC W/AUTO DIFF WBC: CPT | Performed by: INTERNAL MEDICINE

## 2019-01-25 NOTE — PROGRESS NOTES
Patient arrived ambulatory for CBC fingerstick for possible phlebotomy today if Hct >52. Labs completed with Hct 46.4. Patient stated he had given blood on Wednesday and just was keeping his committed appointment to check Hct. Patient's next appointment discussed. Labs given to patient. Patient discharged ambulatory.

## 2019-03-22 ENCOUNTER — LAB (OUTPATIENT)
Dept: OTHER | Facility: HOSPITAL | Age: 69
End: 2019-03-22

## 2019-03-22 ENCOUNTER — INFUSION (OUTPATIENT)
Dept: ONCOLOGY | Facility: HOSPITAL | Age: 69
End: 2019-03-22

## 2019-03-22 VITALS
DIASTOLIC BLOOD PRESSURE: 82 MMHG | SYSTOLIC BLOOD PRESSURE: 134 MMHG | OXYGEN SATURATION: 95 % | TEMPERATURE: 98.1 F | HEART RATE: 87 BPM | BODY MASS INDEX: 32.38 KG/M2 | WEIGHT: 218.6 LBS

## 2019-03-22 DIAGNOSIS — D75.1 SECONDARY POLYCYTHEMIA: ICD-10-CM

## 2019-03-22 LAB
BASOPHILS # BLD AUTO: 0.05 10*3/MM3 (ref 0–0.2)
BASOPHILS NFR BLD AUTO: 1.1 % (ref 0–1.5)
DEPRECATED RDW RBC AUTO: 40.4 FL (ref 37–54)
EOSINOPHIL # BLD AUTO: 0.23 10*3/MM3 (ref 0–0.4)
EOSINOPHIL NFR BLD AUTO: 5.1 % (ref 0.3–6.2)
ERYTHROCYTE [DISTWIDTH] IN BLOOD BY AUTOMATED COUNT: 12.5 % (ref 12.3–15.4)
HCT VFR BLD AUTO: 51.5 % (ref 37.5–51)
HGB BLD-MCNC: 18 G/DL (ref 13–17.7)
IMM GRANULOCYTES # BLD AUTO: 0.05 10*3/MM3 (ref 0–0.05)
IMM GRANULOCYTES NFR BLD AUTO: 1.1 % (ref 0–0.5)
LYMPHOCYTES # BLD AUTO: 0.95 10*3/MM3 (ref 0.7–3.1)
LYMPHOCYTES NFR BLD AUTO: 21.2 % (ref 19.6–45.3)
MCH RBC QN AUTO: 30.6 PG (ref 26.6–33)
MCHC RBC AUTO-ENTMCNC: 35 G/DL (ref 31.5–35.7)
MCV RBC AUTO: 87.6 FL (ref 79–97)
MONOCYTES # BLD AUTO: 0.52 10*3/MM3 (ref 0.1–0.9)
MONOCYTES NFR BLD AUTO: 11.6 % (ref 5–12)
NEUTROPHILS # BLD AUTO: 2.68 10*3/MM3 (ref 1.4–7)
NEUTROPHILS NFR BLD AUTO: 59.9 % (ref 42.7–76)
NRBC BLD AUTO-RTO: 0 /100 WBC (ref 0–0)
PLATELET # BLD AUTO: 165 10*3/MM3 (ref 140–450)
PMV BLD AUTO: 10 FL (ref 6–12)
RBC # BLD AUTO: 5.88 10*6/MM3 (ref 4.14–5.8)
WBC NRBC COR # BLD: 4.48 10*3/MM3 (ref 3.4–10.8)

## 2019-03-22 PROCEDURE — 85025 COMPLETE CBC W/AUTO DIFF WBC: CPT | Performed by: INTERNAL MEDICINE

## 2019-03-22 NOTE — PROGRESS NOTES
Pt here for cbc.  Per Dr. Alvarez's dictation- phlebotomy for HCT greater than 52.  His HCT is 51.5% today.  Pt denies any c/o.  Copy of labs given to patient.      Lab Results   Component Value Date    WBC 4.48 03/22/2019    HGB 18.0 (H) 03/22/2019    HCT 51.5 (H) 03/22/2019    MCV 87.6 03/22/2019     03/22/2019

## 2019-05-17 ENCOUNTER — OFFICE VISIT (OUTPATIENT)
Dept: ONCOLOGY | Facility: CLINIC | Age: 69
End: 2019-05-17

## 2019-05-17 ENCOUNTER — LAB (OUTPATIENT)
Dept: OTHER | Facility: HOSPITAL | Age: 69
End: 2019-05-17

## 2019-05-17 ENCOUNTER — APPOINTMENT (OUTPATIENT)
Dept: ONCOLOGY | Facility: HOSPITAL | Age: 69
End: 2019-05-17

## 2019-05-17 VITALS
BODY MASS INDEX: 32.24 KG/M2 | HEART RATE: 87 BPM | SYSTOLIC BLOOD PRESSURE: 124 MMHG | HEIGHT: 69 IN | OXYGEN SATURATION: 93 % | TEMPERATURE: 98.6 F | RESPIRATION RATE: 18 BRPM | DIASTOLIC BLOOD PRESSURE: 78 MMHG | WEIGHT: 217.7 LBS

## 2019-05-17 DIAGNOSIS — D75.1 SECONDARY POLYCYTHEMIA: Primary | ICD-10-CM

## 2019-05-17 DIAGNOSIS — D75.1 SECONDARY POLYCYTHEMIA: ICD-10-CM

## 2019-05-17 LAB
BASOPHILS # BLD AUTO: 0.04 10*3/MM3 (ref 0–0.2)
BASOPHILS NFR BLD AUTO: 0.7 % (ref 0–1.5)
DEPRECATED RDW RBC AUTO: 39 FL (ref 37–54)
EOSINOPHIL # BLD AUTO: 0.24 10*3/MM3 (ref 0–0.4)
EOSINOPHIL NFR BLD AUTO: 4.5 % (ref 0.3–6.2)
ERYTHROCYTE [DISTWIDTH] IN BLOOD BY AUTOMATED COUNT: 12.7 % (ref 12.3–15.4)
HCT VFR BLD AUTO: 49.8 % (ref 37.5–51)
HGB BLD-MCNC: 16.8 G/DL (ref 13–17.7)
IMM GRANULOCYTES # BLD AUTO: 0.06 10*3/MM3 (ref 0–0.05)
IMM GRANULOCYTES NFR BLD AUTO: 1.1 % (ref 0–0.5)
LYMPHOCYTES # BLD AUTO: 1.12 10*3/MM3 (ref 0.7–3.1)
LYMPHOCYTES NFR BLD AUTO: 20.9 % (ref 19.6–45.3)
MCH RBC QN AUTO: 28.7 PG (ref 26.6–33)
MCHC RBC AUTO-ENTMCNC: 33.7 G/DL (ref 31.5–35.7)
MCV RBC AUTO: 85.1 FL (ref 79–97)
MONOCYTES # BLD AUTO: 0.34 10*3/MM3 (ref 0.1–0.9)
MONOCYTES NFR BLD AUTO: 6.3 % (ref 5–12)
NEUTROPHILS # BLD AUTO: 3.56 10*3/MM3 (ref 1.7–7)
NEUTROPHILS NFR BLD AUTO: 66.5 % (ref 42.7–76)
NRBC BLD AUTO-RTO: 0 /100 WBC (ref 0–0.2)
PLATELET # BLD AUTO: 190 10*3/MM3 (ref 140–450)
PMV BLD AUTO: 10.1 FL (ref 6–12)
RBC # BLD AUTO: 5.85 10*6/MM3 (ref 4.14–5.8)
WBC NRBC COR # BLD: 5.36 10*3/MM3 (ref 3.4–10.8)

## 2019-05-17 PROCEDURE — 99213 OFFICE O/P EST LOW 20 MIN: CPT | Performed by: NURSE PRACTITIONER

## 2019-05-17 PROCEDURE — 36415 COLL VENOUS BLD VENIPUNCTURE: CPT

## 2019-05-17 PROCEDURE — 85025 COMPLETE CBC W/AUTO DIFF WBC: CPT | Performed by: INTERNAL MEDICINE

## 2019-05-17 NOTE — PROGRESS NOTES
"    Reason for follow up:   Polycythemia likely secondary to testosterone replacement    History of Present Illness    Mr. García is a pleasant 68-year-old gentleman with the above medical history here today for scheduled lab review.  He reports feeling quite well with no new questions or concerns.  He remains on a lower dose of testosterone once daily.  [facility].  His hemoglobin is stable today at 16.8 hematocrit 49.8.    He denies flushing, headaches, vision changes, swelling, bleeding or bruising.  He denies fever, chills.  His bowels are moving well.      Past Medical History, Past Surgical History, Social History, Family History have been reviewed and are without significant changes except as mentioned.    Review of Systems   A comprehensive 14 point review of systems was performed and was negative except as mentioned.    Medications:  The current medication list was reviewed in the EMR    ALLERGIES:  No Known Allergies    Objective      Vitals:    05/17/19 0946   BP: 124/78   Pulse: 87   Resp: 18   Temp: 98.6 °F (37 °C)   TempSrc: Oral   SpO2: 93%   Weight: 98.7 kg (217 lb 11.2 oz)   Height: 175 cm (68.9\")   PainSc: 0-No pain     Current Status 5/17/2019   ECOG score 0       Physical Exam   Constitutional: He is oriented to person, place, and time. He appears well-developed and well-nourished.   Eyes: Pupils are equal, round, and reactive to light.   Neck: Normal range of motion.   Cardiovascular: Normal rate.   Pulmonary/Chest: Effort normal.   Abdominal: Soft.   Musculoskeletal: Normal range of motion.   Neurological: He is alert and oriented to person, place, and time.   Psychiatric: He has a normal mood and affect.         RECENT LABS:  Hematology WBC   Date Value Ref Range Status   05/17/2019 5.36 3.40 - 10.80 10*3/mm3 Final     RBC   Date Value Ref Range Status   05/17/2019 5.85 (H) 4.14 - 5.80 10*6/mm3 Final     Hemoglobin   Date Value Ref Range Status "   05/17/2019 16.8 13.0 - 17.7 g/dL Final     Hematocrit   Date Value Ref Range Status   05/17/2019 49.8 37.5 - 51.0 % Final     Platelets   Date Value Ref Range Status   05/17/2019 190 140 - 450 10*3/mm3 Final              Assessment/Plan   1.  Polycythemia: This is likely the result of testosterone replacement.  He has reduced his dose of testosterone from 3 pumps to 1 pump daily.  He also donates blood to the [facility] every 2 months.  His CBC is stable.  His hematocrit remains within a desirable range of 49.8.  Dr. Alvarez has set a parameter of above 52% for phlebotomy.    He will return in 2 months for CBC, RN review of possible phlebotomy, then again in 4 months to see Dr. Alvarez with labs and possible phlebotomy.  I have asked the patient to call the office with any new or worsening symptoms before his scheduled visits.                  5/17/2019      CC:

## 2019-07-08 DIAGNOSIS — D75.1 SECONDARY POLYCYTHEMIA: Primary | ICD-10-CM

## 2019-07-12 ENCOUNTER — APPOINTMENT (OUTPATIENT)
Dept: ONCOLOGY | Facility: HOSPITAL | Age: 69
End: 2019-07-12

## 2019-07-12 ENCOUNTER — INFUSION (OUTPATIENT)
Dept: ONCOLOGY | Facility: HOSPITAL | Age: 69
End: 2019-07-12

## 2019-07-12 ENCOUNTER — LAB (OUTPATIENT)
Dept: OTHER | Facility: HOSPITAL | Age: 69
End: 2019-07-12

## 2019-07-12 VITALS
DIASTOLIC BLOOD PRESSURE: 88 MMHG | WEIGHT: 219 LBS | SYSTOLIC BLOOD PRESSURE: 144 MMHG | BODY MASS INDEX: 32.44 KG/M2 | HEART RATE: 78 BPM | TEMPERATURE: 98.1 F | OXYGEN SATURATION: 92 %

## 2019-07-12 DIAGNOSIS — D75.1 SECONDARY POLYCYTHEMIA: ICD-10-CM

## 2019-07-12 LAB
BASOPHILS # BLD AUTO: 0.04 10*3/MM3 (ref 0–0.2)
BASOPHILS NFR BLD AUTO: 0.8 % (ref 0–1.5)
DEPRECATED RDW RBC AUTO: 41.1 FL (ref 37–54)
EOSINOPHIL # BLD AUTO: 0.22 10*3/MM3 (ref 0–0.4)
EOSINOPHIL NFR BLD AUTO: 4.1 % (ref 0.3–6.2)
ERYTHROCYTE [DISTWIDTH] IN BLOOD BY AUTOMATED COUNT: 13.6 % (ref 12.3–15.4)
HCT VFR BLD AUTO: 49.1 % (ref 37.5–51)
HGB BLD-MCNC: 16.1 G/DL (ref 13–17.7)
IMM GRANULOCYTES # BLD AUTO: 0.02 10*3/MM3 (ref 0–0.05)
IMM GRANULOCYTES NFR BLD AUTO: 0.4 % (ref 0–0.5)
LYMPHOCYTES # BLD AUTO: 1.11 10*3/MM3 (ref 0.7–3.1)
LYMPHOCYTES NFR BLD AUTO: 20.8 % (ref 19.6–45.3)
MCH RBC QN AUTO: 27.2 PG (ref 26.6–33)
MCHC RBC AUTO-ENTMCNC: 32.8 G/DL (ref 31.5–35.7)
MCV RBC AUTO: 83.1 FL (ref 79–97)
MONOCYTES # BLD AUTO: 0.36 10*3/MM3 (ref 0.1–0.9)
MONOCYTES NFR BLD AUTO: 6.8 % (ref 5–12)
NEUTROPHILS # BLD AUTO: 3.58 10*3/MM3 (ref 1.7–7)
NEUTROPHILS NFR BLD AUTO: 67.1 % (ref 42.7–76)
NRBC BLD AUTO-RTO: 0 /100 WBC (ref 0–0.2)
PLATELET # BLD AUTO: 181 10*3/MM3 (ref 140–450)
PMV BLD AUTO: 9.8 FL (ref 6–12)
RBC # BLD AUTO: 5.91 10*6/MM3 (ref 4.14–5.8)
WBC NRBC COR # BLD: 5.33 10*3/MM3 (ref 3.4–10.8)

## 2019-07-12 PROCEDURE — 36415 COLL VENOUS BLD VENIPUNCTURE: CPT

## 2019-07-12 PROCEDURE — 85025 COMPLETE CBC W/AUTO DIFF WBC: CPT | Performed by: INTERNAL MEDICINE

## 2019-09-13 ENCOUNTER — APPOINTMENT (OUTPATIENT)
Dept: LAB | Facility: HOSPITAL | Age: 69
End: 2019-09-13

## 2019-09-13 ENCOUNTER — OFFICE VISIT (OUTPATIENT)
Dept: ONCOLOGY | Facility: CLINIC | Age: 69
End: 2019-09-13

## 2019-09-13 ENCOUNTER — APPOINTMENT (OUTPATIENT)
Dept: ONCOLOGY | Facility: HOSPITAL | Age: 69
End: 2019-09-13

## 2019-09-13 VITALS
HEIGHT: 69 IN | RESPIRATION RATE: 14 BRPM | OXYGEN SATURATION: 94 % | SYSTOLIC BLOOD PRESSURE: 133 MMHG | DIASTOLIC BLOOD PRESSURE: 77 MMHG | WEIGHT: 217.6 LBS | BODY MASS INDEX: 32.23 KG/M2 | TEMPERATURE: 98.1 F | HEART RATE: 72 BPM

## 2019-09-13 DIAGNOSIS — D75.1 SECONDARY POLYCYTHEMIA: Primary | ICD-10-CM

## 2019-09-13 LAB
BASOPHILS # BLD AUTO: 0.06 10*3/MM3 (ref 0–0.2)
BASOPHILS NFR BLD AUTO: 0.9 % (ref 0–1.5)
DEPRECATED RDW RBC AUTO: 41.3 FL (ref 37–54)
EOSINOPHIL # BLD AUTO: 0.22 10*3/MM3 (ref 0–0.4)
EOSINOPHIL NFR BLD AUTO: 3.4 % (ref 0.3–6.2)
ERYTHROCYTE [DISTWIDTH] IN BLOOD BY AUTOMATED COUNT: 13.9 % (ref 12.3–15.4)
HCT VFR BLD AUTO: 51 % (ref 37.5–51)
HGB BLD-MCNC: 16.3 G/DL (ref 13–17.7)
IMM GRANULOCYTES # BLD AUTO: 0.07 10*3/MM3 (ref 0–0.05)
IMM GRANULOCYTES NFR BLD AUTO: 1.1 % (ref 0–0.5)
LYMPHOCYTES # BLD AUTO: 1.22 10*3/MM3 (ref 0.7–3.1)
LYMPHOCYTES NFR BLD AUTO: 19 % (ref 19.6–45.3)
MCH RBC QN AUTO: 26.5 PG (ref 26.6–33)
MCHC RBC AUTO-ENTMCNC: 32 G/DL (ref 31.5–35.7)
MCV RBC AUTO: 82.9 FL (ref 79–97)
MONOCYTES # BLD AUTO: 0.48 10*3/MM3 (ref 0.1–0.9)
MONOCYTES NFR BLD AUTO: 7.5 % (ref 5–12)
NEUTROPHILS # BLD AUTO: 4.37 10*3/MM3 (ref 1.7–7)
NEUTROPHILS NFR BLD AUTO: 68.1 % (ref 42.7–76)
NRBC BLD AUTO-RTO: 0 /100 WBC (ref 0–0.2)
PLATELET # BLD AUTO: 193 10*3/MM3 (ref 140–450)
PMV BLD AUTO: 9.4 FL (ref 6–12)
RBC # BLD AUTO: 6.15 10*6/MM3 (ref 4.14–5.8)
WBC NRBC COR # BLD: 6.42 10*3/MM3 (ref 3.4–10.8)

## 2019-09-13 PROCEDURE — 85025 COMPLETE CBC W/AUTO DIFF WBC: CPT | Performed by: INTERNAL MEDICINE

## 2019-09-13 PROCEDURE — 36416 COLLJ CAPILLARY BLOOD SPEC: CPT | Performed by: INTERNAL MEDICINE

## 2019-09-13 PROCEDURE — 99213 OFFICE O/P EST LOW 20 MIN: CPT | Performed by: INTERNAL MEDICINE

## 2019-09-13 PROCEDURE — G0463 HOSPITAL OUTPT CLINIC VISIT: HCPCS | Performed by: INTERNAL MEDICINE

## 2019-09-13 NOTE — PROGRESS NOTES
Subjective     REASON FOR FOLLOW UP:  Polycythemia    History of Present Illness The patient is a 69 year old male, here today for follow up on his polycythemia, which is felt to be secondary to his testosterone replacement.      He returns today for follow-up.  He is doing well.  He continues testosterone replacement.  He continues blood donation approximately every 2 months at the [facility].  He denies shortness of breath, chest pain or DVT diagnosis.      Past Medical History:   Diagnosis Date   • Acquired blepharoptosis of both eyes    • CAD (coronary artery disease)     mild to moderate per cath 5/2005   • Dermatochalasis of both eyelids    • Environmental allergies    • H/O Epistaxis    • History of obesity    • History of vitamin D deficiency    • Hyperlipidemia    • Seasonal allergies    • Senile cataracts of both eyes    • Skin cancer     precancer   • Sleep apnea     CPAP   • Spinal stenosis         Past Surgical History:   Procedure Laterality Date   • BACK SURGERY     • CARDIAC CATHETERIZATION     • NASAL SINUS SURGERY     • TONSILLECTOMY          Current Outpatient Medications on File Prior to Visit   Medication Sig Dispense Refill   • Azelastine-Fluticasone 137-50 MCG/ACT suspension 50 mcg into the nostril(s) as directed by provider As Needed.     • CETIRIZINE HCL PO cetirizine 10 mg tablet   TAKE ONE TABLET BY MOUTH EVERY EVENING     • citalopram (CeleXA) 20 MG tablet Take 20 mg by mouth daily.     • Fexofenadine HCl (MUCINEX ALLERGY PO) Take 600 mg by mouth Daily As Needed.     • fluticasone (FLONASE) 50 MCG/ACT nasal spray      • levothyroxine (SYNTHROID, LEVOTHROID) 50 MCG tablet Take 50 mcg by mouth Daily.     • meloxicam (MOBIC) 7.5 MG tablet Take 7.5 mg by mouth As Needed.     • Testosterone 20.25 MG/ACT (1.62%) gel      • vitamin D (ERGOCALCIFEROL) 86216 UNITS capsule capsule Take 1.62 Units by mouth 1 (One) Time Per Week.     • [DISCONTINUED] aspirin 81 MG tablet Take 81 mg by mouth daily.    "    No current facility-administered medications on file prior to visit.         ALLERGIES:  No Known Allergies     Social History     Socioeconomic History   • Marital status:      Spouse name: Virginia   • Number of children: Not on file   • Years of education: College   • Highest education level: Not on file   Occupational History   • Occupation: [employer]     Employer: SELF-EMPLOYED   Tobacco Use   • Smoking status: Never Smoker   • Smokeless tobacco: Never Used   Substance and Sexual Activity   • Alcohol use: No   • Drug use: No        Family History   Problem Relation Age of Onset   • Hyperlipidemia Mother    • Heart attack Father         Review of Systems   Constitutional: Negative for fatigue.   Eyes: Negative.    Respiratory: Negative.  Negative for cough and shortness of breath.    Cardiovascular: Negative for leg swelling.   Gastrointestinal: Negative.    Genitourinary: Negative.    Musculoskeletal: Negative.    Skin: Negative.    Neurological: Negative.    Hematological: Negative.    Psychiatric/Behavioral: Negative.         Objective     Vitals:    09/13/19 0954   BP: 133/77   Pulse: 72   Resp: 14   Temp: 98.1 °F (36.7 °C)   TempSrc: Oral   SpO2: 94%   Weight: 98.7 kg (217 lb 9.6 oz)   Height: 175 cm (68.9\")   PainSc: 0-No pain     Current Status 9/13/2019   ECOG score 0       Physical Exam   Constitutional: He is oriented to person, place, and time. He appears well-developed and well-nourished. No distress.   HENT:   Head: Normocephalic and atraumatic.   Eyes: Pupils are equal, round, and reactive to light.   Neck: Normal range of motion.   Cardiovascular: Normal rate, regular rhythm and normal heart sounds.   No murmur heard.  Pulmonary/Chest: Effort normal and breath sounds normal. No respiratory distress. He has no wheezes. He has no rhonchi. He has no rales.   Musculoskeletal: Normal range of motion.   Neurological: He is alert and oriented to person, place, and time.   Skin: Skin is warm and dry. " No rash noted.   Psychiatric: He has a normal mood and affect.   Vitals reviewed.      RECENT LABS:  Hematology WBC   Date Value Ref Range Status   09/13/2019 6.42 3.40 - 10.80 10*3/mm3 Final     RBC   Date Value Ref Range Status   09/13/2019 6.15 (H) 4.14 - 5.80 10*6/mm3 Final     Hemoglobin   Date Value Ref Range Status   09/13/2019 16.3 13.0 - 17.7 g/dL Final     Hematocrit   Date Value Ref Range Status   09/13/2019 51.0 37.5 - 51.0 % Final     Platelets   Date Value Ref Range Status   09/13/2019 193 140 - 450 10*3/mm3 Final          Assessment/Plan     1.  Polycythemia: This patient presents with moderate polycythemia hematocrit 55.2%.  Hematocrit has been elevated since at least January 2018.  The patient denies prior thrombotic history for either venous or arterial thromboses.  He has sleep apnea but is compliant with wearing CPAP at night and during naps.  He has been on testosterone replacement with AndroGel approximately 3 years.  The white blood cell count and platelet count are both normal.  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  The patient does report significant benefit from testosterone and does not want to discontinue he has decreased the dose from 3 pumps down to one polyp    His hematocrit today is 51% and he is scheduled for phlebotomy in 2 weeks at the [facility].    At this time he will continue blood donation every 2 months or 56 days at the [facility].     Since his blood levels have been running in a good range we will schedule him for a CBC in 6 months and 12 months.  I will see him at the 12-month visit.

## 2019-11-21 NOTE — PROGRESS NOTES
Pt here today for cbc review and possible phlebotomy.  Per MD dictation: phlebotomy for HCT 52% or above.  His HCT is 49.1 today.  He denies any c/o.  He states that he donated blood last month and will donate blood next month.  Copy of labs given to patient.  Instructed patient to call with any issues, questions, or concerns. He v/u.     Lab Results   Component Value Date    WBC 5.33 07/12/2019    HGB 16.1 07/12/2019    HCT 49.1 07/12/2019    MCV 83.1 07/12/2019     07/12/2019        English

## 2020-03-16 ENCOUNTER — APPOINTMENT (OUTPATIENT)
Dept: ONCOLOGY | Facility: HOSPITAL | Age: 70
End: 2020-03-16

## 2020-03-24 ENCOUNTER — TELEPHONE (OUTPATIENT)
Dept: ONCOLOGY | Facility: CLINIC | Age: 70
End: 2020-03-24

## 2020-03-24 NOTE — TELEPHONE ENCOUNTER
Patient called to reschedule missed appt from 3/16/20  [facility] back number [phone]   Patient consented to scheduling details left on voicemail

## 2020-09-18 ENCOUNTER — LAB (OUTPATIENT)
Dept: LAB | Facility: HOSPITAL | Age: 70
End: 2020-09-18

## 2020-09-18 ENCOUNTER — OFFICE VISIT (OUTPATIENT)
Dept: ONCOLOGY | Facility: CLINIC | Age: 70
End: 2020-09-18

## 2020-09-18 VITALS
WEIGHT: 215.6 LBS | OXYGEN SATURATION: 97 % | DIASTOLIC BLOOD PRESSURE: 78 MMHG | HEART RATE: 78 BPM | HEIGHT: 69 IN | BODY MASS INDEX: 31.93 KG/M2 | RESPIRATION RATE: 16 BRPM | TEMPERATURE: 97.4 F | SYSTOLIC BLOOD PRESSURE: 148 MMHG

## 2020-09-18 DIAGNOSIS — D75.1 SECONDARY POLYCYTHEMIA: Primary | ICD-10-CM

## 2020-09-18 DIAGNOSIS — D50.0 IRON DEFICIENCY ANEMIA DUE TO CHRONIC BLOOD LOSS: ICD-10-CM

## 2020-09-18 LAB
BASOPHILS # BLD AUTO: 0.08 10*3/MM3 (ref 0–0.2)
BASOPHILS NFR BLD AUTO: 1.2 % (ref 0–1.5)
DEPRECATED RDW RBC AUTO: 44.5 FL (ref 37–54)
EOSINOPHIL # BLD AUTO: 0.28 10*3/MM3 (ref 0–0.4)
EOSINOPHIL NFR BLD AUTO: 4.1 % (ref 0.3–6.2)
ERYTHROCYTE [DISTWIDTH] IN BLOOD BY AUTOMATED COUNT: 17.7 % (ref 12.3–15.4)
HCT VFR BLD AUTO: 44.3 % (ref 37.5–51)
HGB BLD-MCNC: 13.3 G/DL (ref 13–17.7)
IMM GRANULOCYTES # BLD AUTO: 0.06 10*3/MM3 (ref 0–0.05)
IMM GRANULOCYTES NFR BLD AUTO: 0.9 % (ref 0–0.5)
LYMPHOCYTES # BLD AUTO: 1.28 10*3/MM3 (ref 0.7–3.1)
LYMPHOCYTES NFR BLD AUTO: 18.8 % (ref 19.6–45.3)
MCH RBC QN AUTO: 22.4 PG (ref 26.6–33)
MCHC RBC AUTO-ENTMCNC: 30 G/DL (ref 31.5–35.7)
MCV RBC AUTO: 74.5 FL (ref 79–97)
MONOCYTES # BLD AUTO: 0.56 10*3/MM3 (ref 0.1–0.9)
MONOCYTES NFR BLD AUTO: 8.2 % (ref 5–12)
NEUTROPHILS NFR BLD AUTO: 4.54 10*3/MM3 (ref 1.7–7)
NEUTROPHILS NFR BLD AUTO: 66.8 % (ref 42.7–76)
NRBC BLD AUTO-RTO: 0 /100 WBC (ref 0–0.2)
PLATELET # BLD AUTO: 191 10*3/MM3 (ref 140–450)
PMV BLD AUTO: 10.1 FL (ref 6–12)
RBC # BLD AUTO: 5.95 10*6/MM3 (ref 4.14–5.8)
WBC # BLD AUTO: 6.8 10*3/MM3 (ref 3.4–10.8)

## 2020-09-18 PROCEDURE — 99214 OFFICE O/P EST MOD 30 MIN: CPT | Performed by: INTERNAL MEDICINE

## 2020-09-18 PROCEDURE — 36415 COLL VENOUS BLD VENIPUNCTURE: CPT

## 2020-09-18 PROCEDURE — 85025 COMPLETE CBC W/AUTO DIFF WBC: CPT

## 2020-09-18 RX ORDER — ALBUTEROL SULFATE 90 UG/1
AEROSOL, METERED RESPIRATORY (INHALATION)
COMMUNITY
End: 2021-08-25 | Stop reason: SDDI

## 2020-09-18 RX ORDER — KETOROLAC TROMETHAMINE 5 MG/ML
SOLUTION OPHTHALMIC
COMMUNITY

## 2020-09-18 RX ORDER — OSELTAMIVIR PHOSPHATE 75 MG/1
CAPSULE ORAL
COMMUNITY
End: 2021-08-25 | Stop reason: SDDI

## 2020-09-18 RX ORDER — ROSUVASTATIN CALCIUM 20 MG/1
TABLET, COATED ORAL
COMMUNITY
End: 2021-08-25 | Stop reason: SDDI

## 2020-09-18 RX ORDER — GENTAMICIN SULFATE 3 MG/G
OINTMENT OPHTHALMIC
COMMUNITY

## 2020-09-18 RX ORDER — COLESEVELAM 180 1/1
TABLET ORAL
COMMUNITY
End: 2021-08-25 | Stop reason: SDDI

## 2020-09-18 RX ORDER — FERROUS SULFATE 325(65) MG
325 TABLET ORAL
Qty: 15 TABLET | Refills: 11 | Status: SHIPPED | OUTPATIENT
Start: 2020-09-18 | End: 2021-10-04

## 2020-09-18 RX ORDER — TOBRAMYCIN AND DEXAMETHASONE 3; 1 MG/ML; MG/ML
SUSPENSION/ DROPS OPHTHALMIC
COMMUNITY
End: 2021-08-25 | Stop reason: SDDI

## 2020-09-18 RX ORDER — ZOSTER VACCINE LIVE 19400 [PFU]/.65ML
INJECTION, POWDER, LYOPHILIZED, FOR SUSPENSION SUBCUTANEOUS
COMMUNITY

## 2020-09-18 RX ORDER — BETAMETHASONE DIPROPIONATE 0.5 MG/G
CREAM TOPICAL
COMMUNITY
End: 2021-08-25 | Stop reason: SDDI

## 2020-09-18 RX ORDER — EZETIMIBE 10 MG/1
TABLET ORAL
COMMUNITY
End: 2021-08-25 | Stop reason: SDDI

## 2020-09-18 RX ORDER — GUAIFENESIN, PSEUDOEPHEDRINE HYDROCHLORIDE 600; 60 MG/1; MG/1
TABLET, EXTENDED RELEASE ORAL
COMMUNITY

## 2020-09-18 RX ORDER — PREDNISOLONE ACETATE 10 MG/ML
SUSPENSION/ DROPS OPHTHALMIC
COMMUNITY
Start: 2020-08-06 | End: 2021-08-25 | Stop reason: SDDI

## 2020-09-18 RX ORDER — OFLOXACIN 3 MG/ML
SOLUTION/ DROPS OPHTHALMIC
COMMUNITY
Start: 2020-08-06 | End: 2021-10-04

## 2020-09-18 RX ORDER — FENOFIBRATE 160 MG/1
TABLET ORAL
COMMUNITY
End: 2021-08-25 | Stop reason: SDDI

## 2020-09-18 RX ORDER — EPINEPHRINE 0.3 MG/.3ML
INJECTION SUBCUTANEOUS
COMMUNITY

## 2020-09-18 RX ORDER — AZELASTINE HYDROCHLORIDE 137 UG/1
SPRAY, METERED NASAL DAILY
COMMUNITY

## 2020-09-18 NOTE — PROGRESS NOTES
Subjective     REASON FOR FOLLOW UP:  Polycythemia    History of Present Illness The patient is a 70 year old male, here today for follow up on his polycythemia, which is felt to be secondary to his testosterone replacement.      He returns today for follow-up.  He is doing well.  He continues testosterone replacement.  He continues blood donation approximately every 2 months at the [facility].  He denies shortness of breath, chest pain or DVT diagnosis.  He reports good energy levels without significant fatigue.      Past Medical History:   Diagnosis Date   • Acquired blepharoptosis of both eyes    • CAD (coronary artery disease)     mild to moderate per cath 5/2005   • Dermatochalasis of both eyelids    • Environmental allergies    • H/O Epistaxis    • History of obesity    • History of vitamin D deficiency    • Hyperlipidemia    • Seasonal allergies    • Senile cataracts of both eyes    • Skin cancer     precancer   • Sleep apnea     CPAP   • Spinal stenosis         Past Surgical History:   Procedure Laterality Date   • BACK SURGERY     • CARDIAC CATHETERIZATION     • NASAL SINUS SURGERY     • TONSILLECTOMY          Current Outpatient Medications on File Prior to Visit   Medication Sig Dispense Refill   • albuterol sulfate HFA (ProAir HFA) 108 (90 Base) MCG/ACT inhaler ProAir HFA 90 mcg/actuation aerosol inhaler     • Azelastine HCl 137 MCG/SPRAY solution Daily.     • Azelastine-Fluticasone 137-50 MCG/ACT suspension 50 mcg into the nostril(s) as directed by provider As Needed.     • betamethasone dipropionate 0.05 % cream betamethasone dipropionate 0.05 % topical cream   Apply by topical route as needed for 22 days.     • CETIRIZINE HCL PO cetirizine 10 mg tablet   TAKE ONE TABLET BY MOUTH EVERY EVENING     • choline fenofibrate (Trilipix) 135 MG capsule Trilipix 135 mg capsule,delayed release     • citalopram (CeleXA) 20 MG tablet Take 20 mg by mouth daily.     • colesevelam (Welchol) 625 MG tablet WelChol 625  mg tablet     • EPINEPHrine (EpiPen 2-Gary) 0.3 MG/0.3ML solution auto-injector injection EpiPen 2-Gary 0.3 mg/0.3 mL injection, auto-injector     • ezetimibe (Zetia) 10 MG tablet Zetia 10 mg tablet     • fenofibrate 160 MG tablet fenofibrate 160 mg tablet     • Fexofenadine HCl (MUCINEX ALLERGY PO) Take 600 mg by mouth Daily As Needed.     • fluticasone (FLONASE) 50 MCG/ACT nasal spray      • gentamicin (Gentak) 0.3 % ophthalmic ointment Gentak 0.3 % (3 mg/gram) eye ointment     • GLUCOSAMINE-CHONDROIT-BIOFL-MN PO Glucosamine Chondroit Complx Advan 750 mg-100 mg-125 mg-1.65 mg tablet   Take by oral route.     • ketorolac (ACULAR) 0.5 % ophthalmic solution ketorolac 0.5 % eye drops     • levothyroxine (SYNTHROID, LEVOTHROID) 50 MCG tablet Take 50 mcg by mouth Daily.     • meloxicam (MOBIC) 7.5 MG tablet Take 7.5 mg by mouth As Needed.     • Multiple Vitamins-Minerals (MULTIVITAMIN ADULT PO) multivitamin   1 qd     • ofloxacin (OCUFLOX) 0.3 % ophthalmic solution      • oseltamivir (Tamiflu) 75 MG capsule Tamiflu 75 mg capsule     • prednisoLONE acetate (PRED FORTE) 1 % ophthalmic suspension      • pseudoephedrine-guaifenesin (MUCINEX D)  MG per 12 hr tablet Mucinex D 60 mg-600 mg tablet,extended release   Take 1 tablet every day by oral route in the morning.     • rosuvastatin (Crestor) 20 MG tablet Crestor 20 mg tablet     • Testosterone 20.25 MG/ACT (1.62%) gel      • tobramycin-dexamethasone (TOBRADEX) 0.3-0.1 % ophthalmic suspension tobramycin 0.3 %-dexamethasone 0.1 % eye drops,suspension     • vitamin D (ERGOCALCIFEROL) 65107 UNITS capsule capsule Take 1.62 Units by mouth 1 (One) Time Per Week.     • zoster vaccine live (Zostavax) 81748 UNT/0.65ML reconstituted suspension Zostavax (PF) 19,400 unit/0.65 mL subcutaneous suspension       No current facility-administered medications on file prior to visit.         ALLERGIES:    Allergies   Allergen Reactions   • Erythromycin Base Anaphylaxis   • Atorvastatin  "Unknown - Low Severity   • Ezetimibe Unknown - Low Severity   • Rosuvastatin Unknown - Low Severity        Social History     Socioeconomic History   • Marital status:      Spouse name: [place]   • Number of children: Not on file   • Years of education: College   • Highest education level: Not on file   Occupational History   • Occupation: CPA     Employer: SELF-EMPLOYED   Tobacco Use   • Smoking status: Never Smoker   • Smokeless tobacco: Never Used   Substance and Sexual Activity   • Alcohol use: No   • Drug use: No        Family History   Problem Relation Age of Onset   • Hyperlipidemia Mother    • Heart attack Father         Review of Systems   Constitutional: Negative.  Negative for fatigue.   HENT: Negative.    Eyes: Negative.    Respiratory: Negative.  Negative for cough and shortness of breath.    Cardiovascular: Negative for leg swelling.   Gastrointestinal: Negative.    Genitourinary: Negative.    Musculoskeletal: Negative.    Skin: Negative.    Neurological: Negative.    Hematological: Negative.    Psychiatric/Behavioral: Negative.         Objective     Vitals:    09/18/20 0952   BP: 148/78   Pulse: 78   Resp: 16   Temp: 97.4 °F (36.3 °C)   TempSrc: Temporal   SpO2: 97%   Weight: 97.8 kg (215 lb 9.6 oz)   Height: 174 cm (68.5\")   PainSc: 0-No pain     Current Status 9/18/2020   ECOG score 1       Physical Exam   Constitutional: He is oriented to person, place, and time. He appears well-developed. No distress.   HENT:   Head: Normocephalic and atraumatic.   Eyes: Pupils are equal, round, and reactive to light.   Neck: Normal range of motion.   Cardiovascular: Normal rate, regular rhythm and normal heart sounds.   No murmur heard.  Pulmonary/Chest: Effort normal and breath sounds normal. No respiratory distress. He has no wheezes. He has no rhonchi. He has no rales.   Musculoskeletal: Normal range of motion.   Neurological: He is alert and oriented to person, place, and time.   Skin: Skin is warm " and dry. No rash noted.   Vitals reviewed.  Exam unchanged-9/18/2020    RECENT LABS:  Hematology WBC   Date Value Ref Range Status   09/18/2020 6.80 3.40 - 10.80 10*3/mm3 Final   11/25/2019 7.46 4.5 - 11.0 10*3/uL Final     RBC   Date Value Ref Range Status   09/18/2020 5.95 (H) 4.14 - 5.80 10*6/mm3 Final   11/25/2019 5.98 (H) 4.5 - 5.9 10*6/uL Final     Hemoglobin   Date Value Ref Range Status   09/18/2020 13.3 13.0 - 17.7 g/dL Final   11/25/2019 15.3 13.5 - 17.5 g/dL Final     Hematocrit   Date Value Ref Range Status   09/18/2020 44.3 37.5 - 51.0 % Final   11/25/2019 48.1 41.0 - 53.0 % Final     Platelets   Date Value Ref Range Status   09/18/2020 191 140 - 450 10*3/mm3 Final   11/25/2019 188 140 - 440 10*3/uL Final          Assessment/Plan     1.  Polycythemia: This patient presents with moderate polycythemia hematocrit 55.2%.  Hematocrit has been elevated since at least January 2018.  The patient denies prior thrombotic history for either venous or arterial thromboses.  He has sleep apnea but is compliant with wearing CPAP at night and during naps.  He has been on testosterone replacement with AndroGel approximately 3 years.  The white blood cell count and platelet count are both normal.  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  The patient does report significant benefit from testosterone and does not want to discontinue he has decreased the dose from 3 pumps down to one polyp    His hemoglobin/hematocrit today are 13.3/44.3 which is lower than his typical values.  The red cells are microcytic and hypochromic consistent with iron deficiency from his repeated phlebotomies.  This is a new problem.    He wants to continue phlebotomy every 2 months as he feels better after having the procedure.  I think he will continue to have worsening iron deficiency at this rate of phlebotomy.  I therefore recommended take an iron supplement every other day to replete his iron stores  and I do not think this will cause significant polycythemia as long as he continues the phlebotomy program.  I prescribed oral iron to his pharmacy.    Since his blood levels have been running in a good range he will have a CBC with his PCP in 6 months and   I will see him at the 12-month visit with a CBC.

## 2021-06-08 ENCOUNTER — TELEPHONE (OUTPATIENT)
Dept: ONCOLOGY | Facility: CLINIC | Age: 71
End: 2021-06-08

## 2021-06-08 NOTE — TELEPHONE ENCOUNTER
Caller: Barbara García Jr.    Relationship: Self    Best call back number:[phone]    What form or medical record are you requesting: MEDICAL RECORDS    Who is requesting this form or medical record from you: [facility] [employer]    How would you like to receive the form or medical records (pick-up, mail, fax): FAX  If fax, what is the fax number: [phone]    Timeframe paperwork needed: ASAP    Additional notes: BARBARA NEEDS HIS MEDICAL RECORDS SENT OVER SO HE CAN GET A LIFE INSURANCE POLICY.

## 2021-07-07 NOTE — TELEPHONE ENCOUNTER
Caller: Barbara García Jr.    Relationship: Self    Best call back number:[phone]    What was the call regarding: BARBARA CALLED FOR AN UPDATE ON HIS MEDICAL RECORDS. HE IS WORKING ON GETTING LIFE INSURANCE AND WOULD LIKE A CALL BACK.    Do you require a callback: YES

## 2021-08-23 ENCOUNTER — TELEPHONE (OUTPATIENT)
Dept: ONCOLOGY | Facility: HOSPITAL | Age: 71
End: 2021-08-23

## 2021-08-23 NOTE — TELEPHONE ENCOUNTER
----- Message from Monty Alvarez MD sent at 8/23/2021 12:49 PM EDT -----  Received abnormal CBC from his PCP. Can you check if he has been continuing blood donations/phlebotomies?  ----- Message -----  From: Bet[place], Jeannette Incoming  Sent: 8/23/2021  12:45 PM EDT  To: Monty Alvarez MD

## 2021-08-23 NOTE — TELEPHONE ENCOUNTER
Called pt and he states he is donating blood at [facility] every 56 days with next scheduled 9/29. He is also taking oral iron every other day. Per Dr. Alvarez, pt instructed to hold iron and testosterone and come in this week or next with CBC, NP and phlebo. Pt informed and v/u. He requests EP location. Message sent to scheduling.

## 2021-08-24 NOTE — PROGRESS NOTES
Subjective     REASON FOR FOLLOW UP:  Polycythemia, secondary to testosterone replacment    History of Present Illness The patient is a 71 y.o. male here today for lab review and follow up.  He had labs drawn at his PCP on 8/19/2021 which showed a hemoglobin of 19.3 and a hematocrit of 56.2.  He was previously on testosterone replacement and oral iron every other day, however was instructed to hold this on 8/23/2021 due to elevated hemoglobin and hematocrit.  He continues to donate blood with the [facility] every 2 months, last donation 8/4/2021.      Today, he reports feeling well.  He is eating and drinking adequately.  His bowels are moving regularly.  He denies shortness of breath, chest pain, headache, vision changes.  He does report he discontinued oral iron, however he continues on testosterone replacement weekly.  He has no new complaints today.    Past Medical History:   Diagnosis Date   • Acquired blepharoptosis of both eyes    • CAD (coronary artery disease)     mild to moderate per cath 5/2005   • Dermatochalasis of both eyelids    • Environmental allergies    • H/O Epistaxis    • History of obesity    • History of vitamin D deficiency    • Hyperlipidemia    • Seasonal allergies    • Senile cataracts of both eyes    • Skin cancer     precancer   • Sleep apnea     CPAP   • Spinal stenosis       Past Surgical History:   Procedure Laterality Date   • BACK SURGERY     • CARDIAC CATHETERIZATION     • NASAL SINUS SURGERY     • TONSILLECTOMY        Current Outpatient Medications on File Prior to Visit   Medication Sig Dispense Refill   • Azelastine HCl 137 MCG/SPRAY solution Daily.     • Azelastine-Fluticasone 137-50 MCG/ACT suspension 50 mcg into the nostril(s) as directed by provider As Needed.     • CETIRIZINE HCL PO cetirizine 10 mg tablet   TAKE ONE TABLET BY MOUTH EVERY EVENING     • citalopram (CeleXA) 20 MG tablet Take 20 mg by mouth daily.     • EPINEPHrine (EpiPen 2-Gary) 0.3 MG/0.3ML solution  auto-injector injection EpiPen 2-Gary 0.3 mg/0.3 mL injection, auto-injector     • Fexofenadine HCl (MUCINEX ALLERGY PO) Take 600 mg by mouth Daily As Needed.     • fluticasone (FLONASE) 50 MCG/ACT nasal spray      • gentamicin (Gentak) 0.3 % ophthalmic ointment Gentak 0.3 % (3 mg/gram) eye ointment     • ketorolac (ACULAR) 0.5 % ophthalmic solution ketorolac 0.5 % eye drops     • levothyroxine (SYNTHROID, LEVOTHROID) 50 MCG tablet Take 50 mcg by mouth Daily.     • meloxicam (MOBIC) 7.5 MG tablet Take 7.5 mg by mouth As Needed.     • Multiple Vitamins-Minerals (MULTIVITAMIN ADULT PO) multivitamin   1 qd     • ofloxacin (OCUFLOX) 0.3 % ophthalmic solution      • pseudoephedrine-guaifenesin (MUCINEX D)  MG per 12 hr tablet Mucinex D 60 mg-600 mg tablet,extended release   Take 1 tablet every day by oral route in the morning.     • Testosterone 20.25 MG/ACT (1.62%) gel      • vitamin D (ERGOCALCIFEROL) 21188 UNITS capsule capsule Take 1.62 Units by mouth 1 (One) Time Per Week.     • zoster vaccine live (Zostavax) 00205 UNT/0.65ML reconstituted suspension Zostavax (PF) 19,400 unit/0.65 mL subcutaneous suspension     • ferrous sulfate 325 (65 FE) MG tablet Take 1 tablet by mouth Every Other Day. 15 tablet 11   • [DISCONTINUED] albuterol sulfate HFA (ProAir HFA) 108 (90 Base) MCG/ACT inhaler ProAir HFA 90 mcg/actuation aerosol inhaler     • [DISCONTINUED] betamethasone dipropionate 0.05 % cream betamethasone dipropionate 0.05 % topical cream   Apply by topical route as needed for 22 days.     • [DISCONTINUED] choline fenofibrate (Trilipix) 135 MG capsule Trilipix 135 mg capsule,delayed release     • [DISCONTINUED] colesevelam (Welchol) 625 MG tablet WelChol 625 mg tablet     • [DISCONTINUED] ezetimibe (Zetia) 10 MG tablet Zetia 10 mg tablet     • [DISCONTINUED] fenofibrate 160 MG tablet fenofibrate 160 mg tablet     • [DISCONTINUED] GLUCOSAMINE-CHONDROIT-BIOFL-MN PO Glucosamine Chondroit Complx Advan 750 mg-100 mg-125  "mg-1.65 mg tablet   Take by oral route.     • [DISCONTINUED] oseltamivir (Tamiflu) 75 MG capsule Tamiflu 75 mg capsule     • [DISCONTINUED] prednisoLONE acetate (PRED FORTE) 1 % ophthalmic suspension      • [DISCONTINUED] rosuvastatin (Crestor) 20 MG tablet Crestor 20 mg tablet     • [DISCONTINUED] tobramycin-dexamethasone (TOBRADEX) 0.3-0.1 % ophthalmic suspension tobramycin 0.3 %-dexamethasone 0.1 % eye drops,suspension       No current facility-administered medications on file prior to visit.      ALLERGIES:    Allergies   Allergen Reactions   • Erythromycin Base Anaphylaxis   • Atorvastatin Unknown - Low Severity   • Ezetimibe Unknown - Low Severity   • Rosuvastatin Unknown - Low Severity      Social History     Socioeconomic History   • Marital status:      Spouse name: [place]   • Number of children: Not on file   • Years of education: College   • Highest education level: Not on file   Tobacco Use   • Smoking status: Never Smoker   • Smokeless tobacco: Never Used   Substance and Sexual Activity   • Alcohol use: No   • Drug use: No      Family History   Problem Relation Age of Onset   • Hyperlipidemia Mother    • Heart attack Father       Review of systems as mentioned in HPI.    Objective     Vitals:    08/25/21 1426   BP: 152/71   Pulse: 74   Resp: 18   Temp: 97.5 °F (36.4 °C)   TempSrc: Temporal   SpO2: 96%   Weight: 99.3 kg (218 lb 14.4 oz)   Height: 174 cm (68.5\")   PainSc: 0-No pain     Current Status 8/25/2021   ECOG score 0     Physical Exam   Constitutional: He is oriented to person, place, and time. He appears well-developed. No distress.   HENT:   Head: Normocephalic and atraumatic.   Eyes: Pupils are equal, round, and reactive to light.   Cardiovascular: Normal rate, regular rhythm and normal heart sounds.   No murmur heard.  Pulmonary/Chest: Effort normal and breath sounds normal. No respiratory distress. He has no wheezes. He has no rhonchi. He has no rales.   Musculoskeletal: Normal range " of motion.   Neurological: He is alert and oriented to person, place, and time.   Skin: Skin is warm and dry. No rash noted.   Vitals reviewed.    RECENT LABS:  Hematology WBC   Date Value Ref Range Status   08/25/2021 6.29 3.40 - 10.80 10*3/mm3 Final   11/25/2019 7.46 4.5 - 11.0 10*3/uL Final     RBC   Date Value Ref Range Status   08/25/2021 6.03 (H) 4.14 - 5.80 10*6/mm3 Final   11/25/2019 5.98 (H) 4.5 - 5.9 10*6/uL Final     Hemoglobin   Date Value Ref Range Status   08/25/2021 18.4 (H) 13.0 - 17.7 g/dL Final   11/25/2019 15.3 13.5 - 17.5 g/dL Final     Hematocrit   Date Value Ref Range Status   08/25/2021 53.2 (H) 37.5 - 51.0 % Final   11/25/2019 48.1 41.0 - 53.0 % Final     Platelets   Date Value Ref Range Status   08/25/2021 162 140 - 450 10*3/mm3 Final   11/25/2019 188 140 - 440 10*3/uL Final          Assessment/Plan     1.  Polycythemia: This patient presents with moderate polycythemia hematocrit 55.2%.  Hematocrit has been elevated since at least January 2018.  The patient denies prior thrombotic history for either venous or arterial thromboses.  He has sleep apnea but is compliant with wearing CPAP at night and during naps.  He has been on testosterone replacement with AndroGel approximately 3 years.  The white blood cell count and platelet count are both normal.  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  The patient does report significant benefit from testosterone and does not want to discontinue he has decreased the dose from 3 pumps down to one pump.    His hemoglobin/hematocrit 9/18/2021 are 13.3/44.3 which is lower than his typical values.  The red cells are microcytic and hypochromic consistent with iron deficiency from his repeated phlebotomies.  This is a new problem.    He wants to continue phlebotomy every 2 months as he feels better after having the procedure.  We think he will continue to have worsening iron deficiency at this rate of phlebotomy.   Ttherefore recommended take an iron supplement every other day to replete his iron stores and do not think this will cause significant polycythemia as long as he continues the phlebotomy program.      The patient is reevaluated on 8/25/2021 after having labs drawn with his PCP which showed hemoglobin of 19.3 and a hematocrit of 56.2. He was asked to hold testosterone and iron and come in for labs and follow up.  His hemoglobin today is 18.4 with a hematocrit of 53.2.  He discontinued oral iron, however he continues on testosterone replacement weekly.  He continues to donate blood with the [facility] every 56 days, last donation was 8/4/2021.  We will proceed with phlebotomy today.  We will continue to see the patient every other month, and he will continue donating blood every other month on the months he does not see us.    I spent 35 minutes caring for Monty on this date of service. This time includes time spent by me in the following activities: preparing for the visit, reviewing tests, performing a medically appropriate examination and/or evaluation, counseling and educating the patient/family/caregiver, ordering medications, tests, or procedures, documenting information in the medical record and care coordination.     PLAN:   · Proceed with phlebotomy today.  · Continue to donate blood with the [facility] every 56 days, next scheduled 9/29/2021.  · Return in 2 months for CBC with possible phlebotomy and MD follow-up.    SWEETIE Blanc  08/25/2021

## 2021-08-25 ENCOUNTER — OFFICE VISIT (OUTPATIENT)
Dept: ONCOLOGY | Facility: CLINIC | Age: 71
End: 2021-08-25

## 2021-08-25 ENCOUNTER — INFUSION (OUTPATIENT)
Dept: ONCOLOGY | Facility: HOSPITAL | Age: 71
End: 2021-08-25

## 2021-08-25 ENCOUNTER — LAB (OUTPATIENT)
Dept: OTHER | Facility: HOSPITAL | Age: 71
End: 2021-08-25

## 2021-08-25 VITALS
HEART RATE: 74 BPM | TEMPERATURE: 97.5 F | WEIGHT: 218.9 LBS | OXYGEN SATURATION: 96 % | HEIGHT: 69 IN | SYSTOLIC BLOOD PRESSURE: 152 MMHG | RESPIRATION RATE: 18 BRPM | DIASTOLIC BLOOD PRESSURE: 71 MMHG | BODY MASS INDEX: 32.42 KG/M2

## 2021-08-25 VITALS — SYSTOLIC BLOOD PRESSURE: 139 MMHG | HEART RATE: 98 BPM | DIASTOLIC BLOOD PRESSURE: 75 MMHG

## 2021-08-25 DIAGNOSIS — D50.0 IRON DEFICIENCY ANEMIA DUE TO CHRONIC BLOOD LOSS: ICD-10-CM

## 2021-08-25 DIAGNOSIS — D75.1 SECONDARY POLYCYTHEMIA: Primary | ICD-10-CM

## 2021-08-25 DIAGNOSIS — D50.0 IRON DEFICIENCY ANEMIA DUE TO CHRONIC BLOOD LOSS: Primary | ICD-10-CM

## 2021-08-25 DIAGNOSIS — D75.1 SECONDARY POLYCYTHEMIA: ICD-10-CM

## 2021-08-25 LAB
BASOPHILS # BLD AUTO: 0.04 10*3/MM3 (ref 0–0.2)
BASOPHILS NFR BLD AUTO: 0.6 % (ref 0–1.5)
DEPRECATED RDW RBC AUTO: 43.2 FL (ref 37–54)
EOSINOPHIL # BLD AUTO: 0.25 10*3/MM3 (ref 0–0.4)
EOSINOPHIL NFR BLD AUTO: 4 % (ref 0.3–6.2)
ERYTHROCYTE [DISTWIDTH] IN BLOOD BY AUTOMATED COUNT: 13.2 % (ref 12.3–15.4)
HCT VFR BLD AUTO: 53.2 % (ref 37.5–51)
HGB BLD-MCNC: 18.4 G/DL (ref 13–17.7)
IMM GRANULOCYTES # BLD AUTO: 0.03 10*3/MM3 (ref 0–0.05)
IMM GRANULOCYTES NFR BLD AUTO: 0.5 % (ref 0–0.5)
LYMPHOCYTES # BLD AUTO: 1.3 10*3/MM3 (ref 0.7–3.1)
LYMPHOCYTES NFR BLD AUTO: 20.7 % (ref 19.6–45.3)
MCH RBC QN AUTO: 30.5 PG (ref 26.6–33)
MCHC RBC AUTO-ENTMCNC: 34.6 G/DL (ref 31.5–35.7)
MCV RBC AUTO: 88.2 FL (ref 79–97)
MONOCYTES # BLD AUTO: 0.54 10*3/MM3 (ref 0.1–0.9)
MONOCYTES NFR BLD AUTO: 8.6 % (ref 5–12)
NEUTROPHILS NFR BLD AUTO: 4.13 10*3/MM3 (ref 1.7–7)
NEUTROPHILS NFR BLD AUTO: 65.6 % (ref 42.7–76)
NRBC BLD AUTO-RTO: 0 /100 WBC (ref 0–0.2)
PLATELET # BLD AUTO: 162 10*3/MM3 (ref 140–450)
PMV BLD AUTO: 10.2 FL (ref 6–12)
RBC # BLD AUTO: 6.03 10*6/MM3 (ref 4.14–5.8)
WBC # BLD AUTO: 6.29 10*3/MM3 (ref 3.4–10.8)

## 2021-08-25 PROCEDURE — 85025 COMPLETE CBC W/AUTO DIFF WBC: CPT | Performed by: NURSE PRACTITIONER

## 2021-08-25 PROCEDURE — 36415 COLL VENOUS BLD VENIPUNCTURE: CPT

## 2021-08-25 PROCEDURE — 99214 OFFICE O/P EST MOD 30 MIN: CPT | Performed by: NURSE PRACTITIONER

## 2021-08-25 PROCEDURE — 99195 PHLEBOTOMY: CPT

## 2021-08-25 RX ORDER — SODIUM CHLORIDE 9 MG/ML
250 INJECTION, SOLUTION INTRAVENOUS ONCE
OUTPATIENT
Start: 2021-09-01

## 2021-08-25 RX ORDER — SODIUM CHLORIDE 9 MG/ML
250 INJECTION, SOLUTION INTRAVENOUS ONCE
Status: CANCELLED | OUTPATIENT
Start: 2021-08-25

## 2021-09-29 DIAGNOSIS — D75.1 SECONDARY POLYCYTHEMIA: Primary | ICD-10-CM

## 2021-10-04 ENCOUNTER — OFFICE VISIT (OUTPATIENT)
Dept: ONCOLOGY | Facility: CLINIC | Age: 71
End: 2021-10-04

## 2021-10-04 ENCOUNTER — LAB (OUTPATIENT)
Dept: LAB | Facility: HOSPITAL | Age: 71
End: 2021-10-04

## 2021-10-04 VITALS
HEIGHT: 69 IN | OXYGEN SATURATION: 98 % | RESPIRATION RATE: 16 BRPM | TEMPERATURE: 97.1 F | HEART RATE: 87 BPM | SYSTOLIC BLOOD PRESSURE: 150 MMHG | WEIGHT: 216.2 LBS | BODY MASS INDEX: 32.02 KG/M2 | DIASTOLIC BLOOD PRESSURE: 85 MMHG

## 2021-10-04 DIAGNOSIS — D75.1 SECONDARY POLYCYTHEMIA: Primary | ICD-10-CM

## 2021-10-04 DIAGNOSIS — D75.1 SECONDARY POLYCYTHEMIA: ICD-10-CM

## 2021-10-04 LAB
BASOPHILS # BLD AUTO: 0.06 10*3/MM3 (ref 0–0.2)
BASOPHILS NFR BLD AUTO: 0.9 % (ref 0–1.5)
DEPRECATED RDW RBC AUTO: 43.3 FL (ref 37–54)
EOSINOPHIL # BLD AUTO: 0.3 10*3/MM3 (ref 0–0.4)
EOSINOPHIL NFR BLD AUTO: 4.5 % (ref 0.3–6.2)
ERYTHROCYTE [DISTWIDTH] IN BLOOD BY AUTOMATED COUNT: 13.4 % (ref 12.3–15.4)
HCT VFR BLD AUTO: 48.3 % (ref 37.5–51)
HGB BLD-MCNC: 16.7 G/DL (ref 13–17.7)
IMM GRANULOCYTES # BLD AUTO: 0.08 10*3/MM3 (ref 0–0.05)
IMM GRANULOCYTES NFR BLD AUTO: 1.2 % (ref 0–0.5)
LYMPHOCYTES # BLD AUTO: 1.59 10*3/MM3 (ref 0.7–3.1)
LYMPHOCYTES NFR BLD AUTO: 24 % (ref 19.6–45.3)
MCH RBC QN AUTO: 30.6 PG (ref 26.6–33)
MCHC RBC AUTO-ENTMCNC: 34.6 G/DL (ref 31.5–35.7)
MCV RBC AUTO: 88.5 FL (ref 79–97)
MONOCYTES # BLD AUTO: 0.49 10*3/MM3 (ref 0.1–0.9)
MONOCYTES NFR BLD AUTO: 7.4 % (ref 5–12)
NEUTROPHILS NFR BLD AUTO: 4.1 10*3/MM3 (ref 1.7–7)
NEUTROPHILS NFR BLD AUTO: 62 % (ref 42.7–76)
NRBC BLD AUTO-RTO: 0 /100 WBC (ref 0–0.2)
PLATELET # BLD AUTO: 185 10*3/MM3 (ref 140–450)
PMV BLD AUTO: 9.7 FL (ref 6–12)
RBC # BLD AUTO: 5.46 10*6/MM3 (ref 4.14–5.8)
WBC # BLD AUTO: 6.62 10*3/MM3 (ref 3.4–10.8)

## 2021-10-04 PROCEDURE — 99212 OFFICE O/P EST SF 10 MIN: CPT | Performed by: INTERNAL MEDICINE

## 2021-10-04 PROCEDURE — 85025 COMPLETE CBC W/AUTO DIFF WBC: CPT

## 2021-10-04 PROCEDURE — 36415 COLL VENOUS BLD VENIPUNCTURE: CPT

## 2021-10-04 RX ORDER — FLUOROURACIL 50 MG/G
5 CREAM TOPICAL
COMMUNITY
Start: 2021-09-14

## 2021-10-04 NOTE — PROGRESS NOTES
Subjective     REASON FOR FOLLOW UP:  Polycythemia    History of Present Illness The patient is a 71 year old male, here today for follow up on his polycythemia, which is felt to be secondary to his testosterone replacement.      He returns today for follow-up.  He is doing well.  He continues testosterone replacement.  He continues blood donation approximately every 2 months at the [facility].  He reports mild dyspnea on exertion but denies chest pain or DVT diagnosis.  He reports good energy levels without significant fatigue.      Past Medical History:   Diagnosis Date   • Acquired blepharoptosis of both eyes    • CAD (coronary artery disease)     mild to moderate per cath 5/2005   • Dermatochalasis of both eyelids    • Environmental allergies    • H/O Epistaxis    • History of obesity    • History of vitamin D deficiency    • Hyperlipidemia    • Seasonal allergies    • Senile cataracts of both eyes    • Skin cancer     precancer   • Sleep apnea     CPAP   • Spinal stenosis         Past Surgical History:   Procedure Laterality Date   • BACK SURGERY     • CARDIAC CATHETERIZATION     • NASAL SINUS SURGERY     • TONSILLECTOMY          Current Outpatient Medications on File Prior to Visit   Medication Sig Dispense Refill   • Azelastine HCl 137 MCG/SPRAY solution Daily.     • Azelastine-Fluticasone 137-50 MCG/ACT suspension 50 mcg into the nostril(s) as directed by provider As Needed.     • CALCIUM-MAGNESIUM-ZINC PO Take  by mouth.     • CETIRIZINE HCL PO cetirizine 10 mg tablet   TAKE ONE TABLET BY MOUTH EVERY EVENING     • citalopram (CeleXA) 20 MG tablet Take 20 mg by mouth daily.     • EPINEPHrine (EpiPen 2-Gary) 0.3 MG/0.3ML solution auto-injector injection EpiPen 2-Gary 0.3 mg/0.3 mL injection, auto-injector     • Fexofenadine HCl (MUCINEX ALLERGY PO) Take 600 mg by mouth Daily As Needed.     • fluorouracil (EFUDEX) 5 % cream Apply 5 application topically to the appropriate area as directed. 5%     • fluticasone  (FLONASE) 50 MCG/ACT nasal spray      • gentamicin (Gentak) 0.3 % ophthalmic ointment Gentak 0.3 % (3 mg/gram) eye ointment     • influenza virus vacc split PF 0.5 ML suspension prefilled syringe Fluvirin [id number](PF) 45 mcg (15 mcg x3)/0.5 mL intramuscular syringe     • Influenza Virus Vaccine Split (FLUZONE) injection Fluvirin [id number] 45 mcg (15 mcg x 3)/0.5 mL intramuscular suspension     • ketorolac (ACULAR) 0.5 % ophthalmic solution ketorolac 0.5 % eye drops     • levothyroxine (SYNTHROID, LEVOTHROID) 50 MCG tablet Take 50 mcg by mouth Daily.     • meloxicam (MOBIC) 7.5 MG tablet Take 7.5 mg by mouth As Needed.     • Multiple Vitamins-Minerals (MULTIVITAMIN ADULT PO) multivitamin   1 qd     • pramoxine-hydrocortisone 1-2.5 % cream hydrocortisone-pramoxine 2.5 %-1 % topical cream     • pseudoephedrine-guaifenesin (MUCINEX D)  MG per 12 hr tablet Mucinex D 60 mg-600 mg tablet,extended release   Take 1 tablet every day by oral route in the morning.     • Testosterone 20.25 MG/ACT (1.62%) gel      • vitamin D (ERGOCALCIFEROL) 25310 UNITS capsule capsule Take 1.62 Units by mouth 1 (One) Time Per Week.     • zoster vaccine live (Zostavax) 40732 UNT/0.65ML reconstituted suspension Zostavax (PF) 19,400 unit/0.65 mL subcutaneous suspension     • [DISCONTINUED] ferrous sulfate 325 (65 FE) MG tablet Take 1 tablet by mouth Every Other Day. 15 tablet 11   • [DISCONTINUED] ofloxacin (OCUFLOX) 0.3 % ophthalmic solution        No current facility-administered medications on file prior to visit.        ALLERGIES:    Allergies   Allergen Reactions   • Erythromycin Base Anaphylaxis   • Atorvastatin Unknown - Low Severity   • Ezetimibe Unknown - Low Severity   • Rosuvastatin Unknown - Low Severity        Social History     Socioeconomic History   • Marital status:      Spouse name: [place]   • Number of children: Not on file   • Years of education: College   • Highest education level: Not on file   Tobacco Use   •  "Smoking status: Never Smoker   • Smokeless tobacco: Never Used   Substance and Sexual Activity   • Alcohol use: No   • Drug use: No        Family History   Problem Relation Age of Onset   • Hyperlipidemia Mother    • Heart attack Father         Review of Systems   Constitutional: Negative.  Negative for fatigue.   HENT: Negative.    Eyes: Negative.    Respiratory: Positive for shortness of breath (On exertion). Negative for cough.    Cardiovascular: Negative for leg swelling.   Gastrointestinal: Negative.    Genitourinary: Negative.    Musculoskeletal: Negative.    Skin: Negative.    Neurological: Negative.    Hematological: Negative.    Psychiatric/Behavioral: Negative.         Objective     Vitals:    10/04/21 1554   BP: 150/85   Pulse: 87   Resp: 16   Temp: 97.1 °F (36.2 °C)   TempSrc: Infrared   SpO2: 98%   Weight: 98.1 kg (216 lb 3.2 oz)   Height: 174 cm (68.5\")   PainSc: 0-No pain     Current Status 8/25/2021   ECOG score 0       Physical Exam   Constitutional: He is oriented to person, place, and time. He appears well-developed. No distress.   HENT:   Head: Normocephalic and atraumatic.   Eyes: Pupils are equal, round, and reactive to light.   Cardiovascular: Normal rate, regular rhythm and normal heart sounds.   No murmur heard.  Pulmonary/Chest: Effort normal and breath sounds normal. No respiratory distress. He has no wheezes. He has no rhonchi. He has no rales.   Musculoskeletal: Normal range of motion.   Neurological: He is alert and oriented to person, place, and time.   Skin: Skin is warm and dry. No rash noted.   Vitals reviewed.  Exam unchanged-10/4/2021    RECENT LABS:  Hematology WBC   Date Value Ref Range Status   10/04/2021 6.62 3.40 - 10.80 10*3/mm3 Final   11/25/2019 7.46 4.5 - 11.0 10*3/uL Final     RBC   Date Value Ref Range Status   10/04/2021 5.46 4.14 - 5.80 10*6/mm3 Final   11/25/2019 5.98 (H) 4.5 - 5.9 10*6/uL Final     Hemoglobin   Date Value Ref Range Status   10/04/2021 16.7 13.0 - " 17.7 g/dL Final   11/25/2019 15.3 13.5 - 17.5 g/dL Final     Hematocrit   Date Value Ref Range Status   10/04/2021 48.3 37.5 - 51.0 % Final   11/25/2019 48.1 41.0 - 53.0 % Final     Platelets   Date Value Ref Range Status   10/04/2021 185 140 - 450 10*3/mm3 Final   11/25/2019 188 140 - 440 10*3/uL Final          Assessment/Plan     1.  Secondary polycythemia to testosterone replacement: The patient is donating blood at the [facility] every 2 months and has a normal hematocrit 48.3%.  He feels symptomatically better with testosterone replacement and also feels symptomatically better donating blood every 2 months.  I recommended he continue to do so.  Since his blood is checked frequently at the [facility] and with his PCP we will plan to see him back as needed.

## 2022-09-20 VITALS
HEART RATE: 77 BPM | HEART RATE: 74 BPM | HEART RATE: 81 BPM | SYSTOLIC BLOOD PRESSURE: 134 MMHG | DIASTOLIC BLOOD PRESSURE: 72 MMHG | TEMPERATURE: 97.3 F | RESPIRATION RATE: 11 BRPM | RESPIRATION RATE: 10 BRPM | HEIGHT: 68 IN | HEART RATE: 70 BPM | DIASTOLIC BLOOD PRESSURE: 66 MMHG | DIASTOLIC BLOOD PRESSURE: 84 MMHG | SYSTOLIC BLOOD PRESSURE: 132 MMHG | DIASTOLIC BLOOD PRESSURE: 85 MMHG | SYSTOLIC BLOOD PRESSURE: 140 MMHG | HEART RATE: 69 BPM | DIASTOLIC BLOOD PRESSURE: 80 MMHG | OXYGEN SATURATION: 100 % | SYSTOLIC BLOOD PRESSURE: 114 MMHG | HEART RATE: 68 BPM | HEART RATE: 75 BPM | SYSTOLIC BLOOD PRESSURE: 158 MMHG | DIASTOLIC BLOOD PRESSURE: 73 MMHG | RESPIRATION RATE: 8 BRPM | HEART RATE: 72 BPM | RESPIRATION RATE: 12 BRPM | HEART RATE: 66 BPM | RESPIRATION RATE: 17 BRPM | DIASTOLIC BLOOD PRESSURE: 83 MMHG | SYSTOLIC BLOOD PRESSURE: 124 MMHG | OXYGEN SATURATION: 96 % | SYSTOLIC BLOOD PRESSURE: 115 MMHG | DIASTOLIC BLOOD PRESSURE: 70 MMHG | DIASTOLIC BLOOD PRESSURE: 86 MMHG | TEMPERATURE: 97.7 F | RESPIRATION RATE: 16 BRPM | SYSTOLIC BLOOD PRESSURE: 136 MMHG | SYSTOLIC BLOOD PRESSURE: 147 MMHG | WEIGHT: 218 LBS | OXYGEN SATURATION: 99 % | OXYGEN SATURATION: 98 %

## 2022-09-22 ENCOUNTER — AMBULATORY SURGICAL CENTER (OUTPATIENT)
Dept: URBAN - METROPOLITAN AREA SURGERY 17 | Facility: SURGERY | Age: 72
End: 2022-09-22

## 2022-09-22 ENCOUNTER — OFFICE (OUTPATIENT)
Dept: URBAN - METROPOLITAN AREA PATHOLOGY 4 | Facility: PATHOLOGY | Age: 72
End: 2022-09-22

## 2022-09-22 DIAGNOSIS — K63.5 POLYP OF COLON: ICD-10-CM

## 2022-09-22 DIAGNOSIS — Z86.010 PERSONAL HISTORY OF COLONIC POLYPS: ICD-10-CM

## 2022-09-22 DIAGNOSIS — K57.30 DIVERTICULOSIS OF LARGE INTESTINE WITHOUT PERFORATION OR ABS: ICD-10-CM

## 2022-09-22 LAB
GI HISTOLOGY: A. UNSPECIFIED: (no result)
GI HISTOLOGY: PDF REPORT: (no result)

## 2022-09-22 PROCEDURE — 88305 TISSUE EXAM BY PATHOLOGIST: CPT | Performed by: INTERNAL MEDICINE

## 2022-09-22 PROCEDURE — 45385 COLONOSCOPY W/LESION REMOVAL: CPT | Mod: PT | Performed by: INTERNAL MEDICINE
